# Patient Record
Sex: MALE | Race: WHITE | Employment: OTHER | URBAN - NONMETROPOLITAN AREA
[De-identification: names, ages, dates, MRNs, and addresses within clinical notes are randomized per-mention and may not be internally consistent; named-entity substitution may affect disease eponyms.]

---

## 2018-08-03 ENCOUNTER — HOSPITAL ENCOUNTER (OUTPATIENT)
Dept: GENERAL RADIOLOGY | Age: 68
Discharge: HOME | End: 2018-08-03
Payer: MEDICARE

## 2018-08-03 ENCOUNTER — HOSPITAL ENCOUNTER (OUTPATIENT)
Age: 68
Discharge: HOME | End: 2018-08-03
Payer: MEDICARE

## 2018-08-03 DIAGNOSIS — M25.562 LEFT KNEE PAIN, UNSPECIFIED CHRONICITY: ICD-10-CM

## 2018-08-03 PROCEDURE — 73564 X-RAY EXAM KNEE 4 OR MORE: CPT

## 2018-08-29 ENCOUNTER — HOSPITAL ENCOUNTER (OUTPATIENT)
Dept: MRI IMAGING | Age: 68
Discharge: HOME | End: 2018-08-29
Payer: MEDICARE

## 2018-08-29 DIAGNOSIS — M25.562 LEFT KNEE PAIN, UNSPECIFIED CHRONICITY: ICD-10-CM

## 2018-08-29 PROCEDURE — 73721 MRI JNT OF LWR EXTRE W/O DYE: CPT

## 2018-10-10 ENCOUNTER — HOSPITAL ENCOUNTER (OUTPATIENT)
Dept: CT IMAGING | Age: 68
Discharge: HOME | End: 2018-10-10
Payer: MEDICARE

## 2018-10-10 DIAGNOSIS — I71.20 THORACIC AORTIC ANEURYSM WITHOUT RUPTURE: ICD-10-CM

## 2018-10-10 LAB — POC CREATININE WHOLE BLOOD: 0.9 MG/DL (ref 0.5–1.2)

## 2018-10-10 PROCEDURE — 6360000004 HC RX CONTRAST MEDICATION: Performed by: THORACIC SURGERY (CARDIOTHORACIC VASCULAR SURGERY)

## 2018-10-10 PROCEDURE — 82565 ASSAY OF CREATININE: CPT

## 2018-10-10 PROCEDURE — 71275 CT ANGIOGRAPHY CHEST: CPT

## 2018-10-10 RX ADMIN — IOPAMIDOL 90 ML: 755 INJECTION, SOLUTION INTRAVENOUS at 09:16

## 2019-04-08 ENCOUNTER — HOSPITAL ENCOUNTER (EMERGENCY)
Age: 69
Discharge: HOME | End: 2019-04-08
Attending: EMERGENCY MEDICINE
Payer: MEDICARE

## 2019-04-08 ENCOUNTER — APPOINTMENT (OUTPATIENT)
Dept: CT IMAGING | Age: 69
End: 2019-04-08
Payer: MEDICARE

## 2019-04-08 VITALS
HEIGHT: 68 IN | DIASTOLIC BLOOD PRESSURE: 92 MMHG | SYSTOLIC BLOOD PRESSURE: 143 MMHG | HEART RATE: 70 BPM | TEMPERATURE: 97.5 F | WEIGHT: 210 LBS | BODY MASS INDEX: 31.83 KG/M2 | RESPIRATION RATE: 16 BRPM | OXYGEN SATURATION: 94 %

## 2019-04-08 DIAGNOSIS — R73.9 HYPERGLYCEMIA: ICD-10-CM

## 2019-04-08 DIAGNOSIS — I49.3 FREQUENT PVCS: Primary | ICD-10-CM

## 2019-04-08 LAB
ALBUMIN SERPL-MCNC: 4.2 G/DL (ref 3.5–5.1)
ALP BLD-CCNC: 55 U/L (ref 38–126)
ALT SERPL-CCNC: 48 U/L (ref 11–66)
ANION GAP SERPL CALCULATED.3IONS-SCNC: 15 MEQ/L (ref 8–16)
AST SERPL-CCNC: 32 U/L (ref 5–40)
BASOPHILS # BLD: 0.7 %
BASOPHILS ABSOLUTE: 0.1 THOU/MM3 (ref 0–0.1)
BILIRUB SERPL-MCNC: 1.2 MG/DL (ref 0.3–1.2)
BUN BLDV-MCNC: 18 MG/DL (ref 7–22)
CALCIUM SERPL-MCNC: 9 MG/DL (ref 8.5–10.5)
CHLORIDE BLD-SCNC: 101 MEQ/L (ref 98–111)
CO2: 24 MEQ/L (ref 23–33)
CREAT SERPL-MCNC: 1 MG/DL (ref 0.4–1.2)
EKG ATRIAL RATE: 88 BPM
EKG P AXIS: 15 DEGREES
EKG P-R INTERVAL: 150 MS
EKG Q-T INTERVAL: 360 MS
EKG QRS DURATION: 84 MS
EKG QTC CALCULATION (BAZETT): 435 MS
EKG R AXIS: 11 DEGREES
EKG T AXIS: 55 DEGREES
EKG VENTRICULAR RATE: 88 BPM
EOSINOPHIL # BLD: 4.6 %
EOSINOPHILS ABSOLUTE: 0.4 THOU/MM3 (ref 0–0.4)
ERYTHROCYTE [DISTWIDTH] IN BLOOD BY AUTOMATED COUNT: 13.6 % (ref 11.5–14.5)
ERYTHROCYTE [DISTWIDTH] IN BLOOD BY AUTOMATED COUNT: 46.5 FL (ref 35–45)
GFR SERPL CREATININE-BSD FRML MDRD: 74 ML/MIN/1.73M2
GLUCOSE BLD-MCNC: 228 MG/DL (ref 70–108)
GLUCOSE BLD-MCNC: 89 MG/DL (ref 70–108)
HCT VFR BLD CALC: 48 % (ref 42–52)
HEMOGLOBIN: 16.2 GM/DL (ref 14–18)
IMMATURE GRANS (ABS): 0.07 THOU/MM3 (ref 0–0.07)
IMMATURE GRANULOCYTES: 0.8 %
LYMPHOCYTES # BLD: 40.2 %
LYMPHOCYTES ABSOLUTE: 3.4 THOU/MM3 (ref 1–4.8)
MCH RBC QN AUTO: 31.6 PG (ref 26–33)
MCHC RBC AUTO-ENTMCNC: 33.8 GM/DL (ref 32.2–35.5)
MCV RBC AUTO: 93.6 FL (ref 80–94)
MONOCYTES # BLD: 6.2 %
MONOCYTES ABSOLUTE: 0.5 THOU/MM3 (ref 0.4–1.3)
NUCLEATED RED BLOOD CELLS: 0 /100 WBC
OSMOLALITY CALCULATION: 288.5 MOSMOL/KG (ref 275–300)
PLATELET # BLD: 352 THOU/MM3 (ref 130–400)
PMV BLD AUTO: 10.5 FL (ref 9.4–12.4)
POTASSIUM SERPL-SCNC: 4.1 MEQ/L (ref 3.5–5.2)
RBC # BLD: 5.13 MILL/MM3 (ref 4.7–6.1)
SEG NEUTROPHILS: 47.5 %
SEGMENTED NEUTROPHILS ABSOLUTE COUNT: 4 THOU/MM3 (ref 1.8–7.7)
SODIUM BLD-SCNC: 140 MEQ/L (ref 135–145)
TOTAL PROTEIN: 7.1 G/DL (ref 6.1–8)
TROPONIN T: < 0.01 NG/ML
TROPONIN T: < 0.01 NG/ML
TSH SERPL DL<=0.05 MIU/L-ACNC: 0.72 UIU/ML (ref 0.4–4.2)
WBC # BLD: 8.5 THOU/MM3 (ref 4.8–10.8)

## 2019-04-08 PROCEDURE — 84484 ASSAY OF TROPONIN QUANT: CPT

## 2019-04-08 PROCEDURE — 82948 REAGENT STRIP/BLOOD GLUCOSE: CPT

## 2019-04-08 PROCEDURE — 85025 COMPLETE CBC W/AUTO DIFF WBC: CPT

## 2019-04-08 PROCEDURE — 80053 COMPREHEN METABOLIC PANEL: CPT

## 2019-04-08 PROCEDURE — 6360000004 HC RX CONTRAST MEDICATION: Performed by: EMERGENCY MEDICINE

## 2019-04-08 PROCEDURE — 84443 ASSAY THYROID STIM HORMONE: CPT

## 2019-04-08 PROCEDURE — 71275 CT ANGIOGRAPHY CHEST: CPT

## 2019-04-08 PROCEDURE — 93010 ELECTROCARDIOGRAM REPORT: CPT | Performed by: NUCLEAR MEDICINE

## 2019-04-08 PROCEDURE — 93005 ELECTROCARDIOGRAM TRACING: CPT | Performed by: EMERGENCY MEDICINE

## 2019-04-08 PROCEDURE — 2580000003 HC RX 258: Performed by: EMERGENCY MEDICINE

## 2019-04-08 PROCEDURE — 99285 EMERGENCY DEPT VISIT HI MDM: CPT

## 2019-04-08 PROCEDURE — 36415 COLL VENOUS BLD VENIPUNCTURE: CPT

## 2019-04-08 RX ORDER — 0.9 % SODIUM CHLORIDE 0.9 %
250 INTRAVENOUS SOLUTION INTRAVENOUS ONCE
Status: COMPLETED | OUTPATIENT
Start: 2019-04-08 | End: 2019-04-08

## 2019-04-08 RX ORDER — HYDROCHLOROTHIAZIDE 12.5 MG/1
12.5 CAPSULE, GELATIN COATED ORAL DAILY
COMMUNITY

## 2019-04-08 RX ADMIN — SODIUM CHLORIDE 250 ML: 9 INJECTION, SOLUTION INTRAVENOUS at 12:20

## 2019-04-08 RX ADMIN — IOPAMIDOL 80 ML: 755 INJECTION, SOLUTION INTRAVENOUS at 12:51

## 2019-04-08 ASSESSMENT — ENCOUNTER SYMPTOMS
WHEEZING: 0
PHOTOPHOBIA: 0
ABDOMINAL PAIN: 0
BLOOD IN STOOL: 0
BACK PAIN: 1
CHEST TIGHTNESS: 0
NAUSEA: 0
RHINORRHEA: 0
SHORTNESS OF BREATH: 0
CONSTIPATION: 0
COUGH: 0
SORE THROAT: 0
DIARRHEA: 0
EYE PAIN: 0
VOMITING: 0

## 2019-04-08 ASSESSMENT — PAIN DESCRIPTION - LOCATION: LOCATION: BACK

## 2019-04-08 ASSESSMENT — PAIN DESCRIPTION - ORIENTATION: ORIENTATION: UPPER;MID

## 2019-04-08 ASSESSMENT — PAIN SCALES - GENERAL: PAINLEVEL_OUTOF10: 1

## 2019-04-08 ASSESSMENT — PAIN DESCRIPTION - PAIN TYPE: TYPE: ACUTE PAIN

## 2019-04-08 NOTE — ED NOTES
Checked patients blood glucose. Patient reports feeling better. Kylie Rivers NP notified.       Joaquim Thompson RN  04/08/19 0270

## 2019-04-08 NOTE — ED NOTES
Called this RN to room. States he feels like his blood sugar is dropping.  Gave patient orange juice and turkey Salina Medel RN  04/08/19 2979

## 2019-04-08 NOTE — ED TRIAGE NOTES
Presents to ED with c/o dizziness. Reports he was at [employer] today, became dizzy and checked his radial pulse and felt it skipped a beat. Reports it makes \"me feel like my throat is closing\". Denies symptoms at this time. Reports having a lot of stress in his family. Respirations easy and unlabored.

## 2019-04-08 NOTE — ED PROVIDER NOTES
[facility]  eMERGENCY dEPARTMENT eNCOUnter          CHIEF COMPLAINT       Chief Complaint   Patient presents with    Dizziness       Nurses Notes reviewed and I agree except as noted in the HPI. HISTORY OF PRESENT ILLNESS    Ellie Haji is a 80 y.o. male who presents to the Emergency Department for the evaluation of dizziness and chest pain. The patient reports a history of hypertension, anxiety, thoracic aortic aneurysm, and thyroid disease. He states that he had an urge to feel his radial pulse 4 days ago when he noticed some occasional palpitations. He reports that he has been checking his radial pulse since, and that he has continued to feel the palpitations. The patient states that he has experienced some anxiety over the extra beats, and that he experienced an episode of dizziness and left-sided chest pain with radiation into his left arm this morning. He denies experiencing any chest pain currently, but does report some upper back pain which he describes as tight and rates at a 1/10 in severity. The patient states that he helped move his son from [facility] back to [facility] prior to the onset of his symptoms where he may have strained his back. He denies that he has been experiencing any shortness of breath, abdominal pain, nausea, emesis, fever, chills, weakness, numbness, or tingling. The patient denies further complaints at initial encounter. The HPI was provided by the patient. REVIEW OF SYSTEMS     Review of Systems   Constitutional: Positive for fatigue. Negative for appetite change, chills, diaphoresis and fever. HENT: Negative for congestion, ear pain, rhinorrhea and sore throat. Eyes: Negative for photophobia, pain and visual disturbance. Respiratory: Negative for cough, chest tightness, shortness of breath and wheezing. Cardiovascular: Positive for chest pain (left-sided). Negative for palpitations and leg swelling.    Gastrointestinal: Negative for abdominal pain, blood in stool, constipation, diarrhea, nausea and vomiting. Genitourinary: Negative for difficulty urinating, dysuria and hematuria. Musculoskeletal: Positive for arthralgias (left arm) and back pain (upper). Negative for joint swelling, neck pain and neck stiffness. Skin: Negative for pallor and rash. Neurological: Positive for dizziness. Negative for syncope, weakness, light-headedness, numbness and headaches. Hematological: Negative for adenopathy. Psychiatric/Behavioral: Negative for confusion and suicidal ideas. The patient is nervous/anxious. PAST MEDICALHISTORY    has a past medical history of Hypertension and Thyroid disease. SURGICAL HISTORY      has a past surgical history that includes Tonsillectomy. CURRENT MEDICATIONS       Discharge Medication List as of 4/8/2019  2:44 PM      CONTINUE these medications which have NOT CHANGED    Details   hydrochlorothiazide (MICROZIDE) 12.5 MG capsule Take 12.5 mg by mouth dailyHistorical Med      benazepril (LOTENSIN) 20 MG tablet Take 20 mg by mouth 2 times daily. levothyroxine (SYNTHROID) 200 MCG tablet Take 225 mcg by mouth Daily. aspirin 81 MG tablet Take 81 mg by mouth daily. vitamin B-12 (CYANOCOBALAMIN) 1000 MCG tablet Take 1,000 mcg by mouth daily. ALPRAZolam (XANAX) 0.25 MG tablet Take 0.25 mg by mouth nightly as needed. ALLERGIES     is allergic to norvasc [amlodipine]. FAMILY HISTORY     has no family status information on file. family history is not on file. SOCIAL HISTORY      reports that he has never smoked. He does not have any smokeless tobacco history on file. He reports that he drinks about 3.5 oz of alcohol per week. PHYSICAL EXAM     INITIAL VITALS:  height is 5' 8\" (1.727 m) and weight is 210 lb (95.3 kg). His oral temperature is 97.5 °F (36.4 °C). His blood pressure is 143/92 (abnormal) and his pulse is 70. His respiration is 16 and oxygen saturation is 94%.     Physical Exam   Constitutional: He is oriented to person, place, and time. Vital signs are normal. He appears well-developed and well-nourished. Non-toxic appearance. No distress. HENT:   Head: Normocephalic and atraumatic. Right Ear: Hearing normal.   Left Ear: Hearing normal.   Nose: Nose normal. No rhinorrhea. Mouth/Throat: Uvula is midline, oropharynx is clear and moist and mucous membranes are normal. No oropharyngeal exudate. Eyes: Pupils are equal, round, and reactive to light. Conjunctivae, EOM and lids are normal. No scleral icterus. Neck: Normal range of motion. Neck supple. No neck rigidity. No tracheal deviation present. Cardiovascular: Normal rate, regular rhythm and normal heart sounds. Occasional extrasystoles are present. No murmur heard. Pulmonary/Chest: Effort normal and breath sounds normal. No stridor. No respiratory distress. He has no decreased breath sounds. He has no wheezes. Abdominal: Soft. He exhibits no distension. There is no tenderness. There is no rigidity and no guarding. Musculoskeletal: Normal range of motion. He exhibits no edema. Lymphadenopathy:     He has no cervical adenopathy. Neurological: He is alert and oriented to person, place, and time. He has normal strength. Gait normal. GCS eye subscore is 4. GCS verbal subscore is 5. GCS motor subscore is 6. No gross deficits observed   Skin: Skin is warm, dry and intact. No rash noted. He is not diaphoretic. No pallor. Psychiatric: His speech is normal and behavior is normal. Thought content normal. His mood appears anxious. Nursing note and vitals reviewed. DIFFERENTIAL DIAGNOSIS:   Including, but not limited to: Anxiety, ACS, hypokalemia, hypo/hyperthyroidism, rule out thoracic dissection    DIAGNOSTIC RESULTS     EKG: All EKG's are interpreted by the Emergency Department Physician who either signs or Co-signs thischart in the absence of a cardiologist.    Valdemar Barrios.  Rate: 88 bpm  RI interval: 150 ms  QRS duration: 84 ms  QTc: 435 ms  P-R-T axes: 15, 11, 55  Sinus rhythm with occasional premature ventricular complexes  Nonspecific ST abnormality  No STEMI  Compared to old EKG on March-    RADIOLOGY: non-plain film images(s) such as CT,Ultrasound and MRI are read by the radiologist.    [address] [place] [address]   Final Result      Ascending thoracic aorta measures 4.0 x  3.9 cm. No evidence for dissection. Appearance is stable from prior study. **This report has been created using voice recognition software. It may contain minor errors which are inherent in voice recognition technology. **      Final report electronically signed by Dr. Benitez Plascencia on 4/8/2019 1:16 PM          LABS:     Labs Reviewed   CBC WITH AUTO DIFFERENTIAL - Abnormal; Notable for the following components:       Result Value    RDW-SD 46.5 (*)     All other components within normal limits   COMPREHENSIVE METABOLIC PANEL - Abnormal; Notable for the following components:    Glucose 228 (*)     All other components within normal limits   GLOMERULAR FILTRATION RATE, ESTIMATED - Abnormal; Notable for the following components:    Est, Glom Filt Rate 74 (*)     All other components within normal limits   TSH WITHOUT REFLEX   TROPONIN   ANION GAP   OSMOLALITY   TROPONIN   POCT GLUCOSE       EMERGENCY DEPARTMENT COURSE:   Vitals:    Vitals:    04/08/19 1055 04/08/19 1212 04/08/19 1309   BP: (!) 143/92     Pulse: 90  70   Resp: 19 18 16   Temp: 97.5 °F (36.4 °C)     TempSrc: Oral     SpO2: 96% 94% 94%   Weight: 210 lb (95.3 kg)     Height: 5' 8\" (1.727 m)         12:02 PM: The patient was seen and evaluated. MDM:  The patient was seen within the ED today for the evaluation of dizziness and chest pain. The patient arrived in no acute distress and in stable condition. Within the department, I observed the patient's vital signs to be within acceptable range and his EKG revealed sinus rhythm with occasional premature ventricular complexes.  On exam, I appreciated occasional extrasystoles, normal lung sounds, that the patient was neurologically intact, and that he appeared anxious. Radiological studies within the department revealed his thoracic aorta measuring 4.0 x  3.9 cm which appeared stable. Laboratory work was reassuring. Within the department, the patient was treated with sodium chloride. I observed the patient's condition to remain stable during the duration of his stay. I explained my proposed course of treatment to the patient, who was amenable to my decision, and I answered all questions that were asked. He was discharged home in stable condition, and the patient will return to the ED if his symptoms become more severe in nature or otherwise change. I advised the patient to follow-up with his PCP. I also discussed return to ED precautions with the patient who verbalized understanding. CRITICAL CARE:   None     CONSULTS:  None    PROCEDURES:  None    FINAL IMPRESSION      1. Frequent PVCs    2. Hyperglycemia          DISPOSITION/PLAN   Discharged in stable condition    PATIENT REFERRED TO:  Catracho Hernandez MD  [address] [place]  [phone]            DISCHARGE MEDICATIONS:  Discharge Medication List as of 4/8/2019  2:44 PM          (Please note that portions of this note were completed with a voice recognition program.  Efforts were made to edit the dictations but occasionally words are mis-transcribed.)    Thepatient was given an opportunity to see the Emergency Attending. The patient voiced understanding that I was a Mid-Level Provider and was in agreement with being seen independently by myself. Scribe:  Marvin Nina 4/8/19 12:02 PM Scribing for and in the presence of Marke Figures, CNP.     Signed by: Bert Coy, 04/08/19 4:13 PM    Provider:  I personally performed the services described in the documentation, reviewed and edited the documentation which was dictated to the scribe in my presence, and it accurately records my words and actions.     Feliberto Summers, CNP 4/8/19 4:13 PM       JAMAR Brown - CNP  04/08/19 6115

## 2019-04-08 NOTE — ED NOTES
Patient resting in bed. Respirations easy and unlabored. No distress noted. Call light within reach. Updated on POC. Will monitor.       Anayeli Quinn RN  04/08/19 9929

## 2019-05-13 ENCOUNTER — HOSPITAL ENCOUNTER (OUTPATIENT)
Dept: NON INVASIVE DIAGNOSTICS | Age: 69
Discharge: HOME | End: 2019-05-13
Payer: MEDICARE

## 2019-05-13 PROCEDURE — 93017 CV STRESS TEST TRACING ONLY: CPT

## 2019-05-13 PROCEDURE — 2709999900 HC NON-CHARGEABLE SUPPLY

## 2019-05-13 PROCEDURE — 78452 HT MUSCLE IMAGE SPECT MULT: CPT

## 2019-05-13 PROCEDURE — 3430000000 HC RX DIAGNOSTIC RADIOPHARMACEUTICAL: Performed by: FAMILY MEDICINE

## 2019-05-13 PROCEDURE — 93017 CV STRESS TEST TRACING ONLY: CPT | Performed by: INTERNAL MEDICINE

## 2019-05-13 PROCEDURE — A9500 TC99M SESTAMIBI: HCPCS | Performed by: FAMILY MEDICINE

## 2019-05-13 PROCEDURE — 6360000002 HC RX W HCPCS

## 2019-05-13 RX ADMIN — Medication 33 MILLICURIE: at 09:42

## 2019-05-13 RX ADMIN — Medication 9.9 MILLICURIE: at 08:25

## 2021-07-13 ENCOUNTER — HOSPITAL ENCOUNTER (OUTPATIENT)
Dept: GENERAL RADIOLOGY | Age: 71
Discharge: HOME | End: 2021-07-13
Payer: MEDICARE

## 2021-07-13 ENCOUNTER — HOSPITAL ENCOUNTER (OUTPATIENT)
Age: 71
Discharge: HOME | End: 2021-07-13
Payer: MEDICARE

## 2021-07-13 DIAGNOSIS — R52 PAIN: ICD-10-CM

## 2021-07-13 PROCEDURE — 73564 X-RAY EXAM KNEE 4 OR MORE: CPT

## 2021-09-28 ENCOUNTER — HOSPITAL ENCOUNTER (OUTPATIENT)
Dept: CT IMAGING | Age: 71
Discharge: HOME | End: 2021-09-28
Payer: MEDICARE

## 2021-09-28 DIAGNOSIS — I71.21 ASCENDING AORTIC ANEURYSM: ICD-10-CM

## 2021-09-28 LAB — POC CREATININE WHOLE BLOOD: 1.1 MG/DL (ref 0.5–1.2)

## 2021-09-28 PROCEDURE — 71275 CT ANGIOGRAPHY CHEST: CPT

## 2021-09-28 PROCEDURE — 82565 ASSAY OF CREATININE: CPT

## 2021-09-28 PROCEDURE — 6360000004 HC RX CONTRAST MEDICATION: Performed by: THORACIC SURGERY (CARDIOTHORACIC VASCULAR SURGERY)

## 2021-09-28 RX ADMIN — IOPAMIDOL 85 ML: 755 INJECTION, SOLUTION INTRAVENOUS at 10:05

## 2022-08-04 ENCOUNTER — NURSE ONLY (OUTPATIENT)
Dept: LAB | Age: 72
End: 2022-08-04

## 2022-08-04 LAB
ALBUMIN SERPL-MCNC: 4.6 G/DL (ref 3.5–5.1)
ALP BLD-CCNC: 59 U/L (ref 38–126)
ALT SERPL-CCNC: 31 U/L (ref 11–66)
ANION GAP SERPL CALCULATED.3IONS-SCNC: 12 MEQ/L (ref 8–16)
AST SERPL-CCNC: 27 U/L (ref 5–40)
BILIRUB SERPL-MCNC: 0.7 MG/DL (ref 0.3–1.2)
BUN BLDV-MCNC: 22 MG/DL (ref 7–22)
CALCIUM SERPL-MCNC: 9.8 MG/DL (ref 8.5–10.5)
CHLORIDE BLD-SCNC: 102 MEQ/L (ref 98–111)
CO2: 27 MEQ/L (ref 23–33)
CREAT SERPL-MCNC: 1.1 MG/DL (ref 0.4–1.2)
ERYTHROCYTE [DISTWIDTH] IN BLOOD BY AUTOMATED COUNT: 14.5 % (ref 11.5–14.5)
ERYTHROCYTE [DISTWIDTH] IN BLOOD BY AUTOMATED COUNT: 49.8 FL (ref 35–45)
GFR SERPL CREATININE-BSD FRML MDRD: 66 ML/MIN/1.73M2
GLUCOSE BLD-MCNC: 81 MG/DL (ref 70–108)
HCT VFR BLD CALC: 48.5 % (ref 42–52)
HEMOGLOBIN: 15.9 GM/DL (ref 14–18)
MCH RBC QN AUTO: 30.8 PG (ref 26–33)
MCHC RBC AUTO-ENTMCNC: 32.8 GM/DL (ref 32.2–35.5)
MCV RBC AUTO: 94 FL (ref 80–94)
PLATELET # BLD: 364 THOU/MM3 (ref 130–400)
PMV BLD AUTO: 11 FL (ref 9.4–12.4)
POTASSIUM SERPL-SCNC: 4.7 MEQ/L (ref 3.5–5.2)
RBC # BLD: 5.16 MILL/MM3 (ref 4.7–6.1)
SODIUM BLD-SCNC: 141 MEQ/L (ref 135–145)
TOTAL PROTEIN: 7.3 G/DL (ref 6.1–8)
TSH SERPL DL<=0.05 MIU/L-ACNC: 1.67 UIU/ML (ref 0.4–4.2)
WBC # BLD: 10.2 THOU/MM3 (ref 4.8–10.8)

## 2022-08-05 LAB
PROSTATE SPECIFIC ANTIGEN: 1.82 NG/ML (ref 0–1)
VITAMIN B-12: 1002 PG/ML (ref 211–911)
VITAMIN D 25-HYDROXY: 120 NG/ML (ref 30–100)

## 2022-11-02 ENCOUNTER — HOSPITAL ENCOUNTER (OUTPATIENT)
Age: 72
Discharge: HOME | End: 2022-11-02
Payer: MEDICARE

## 2022-11-02 ENCOUNTER — HOSPITAL ENCOUNTER (OUTPATIENT)
Dept: GENERAL RADIOLOGY | Age: 72
Discharge: HOME | End: 2022-11-02
Payer: MEDICARE

## 2022-11-02 ENCOUNTER — NURSE ONLY (OUTPATIENT)
Dept: LAB | Age: 72
End: 2022-11-02

## 2022-11-02 DIAGNOSIS — M25.561 RIGHT KNEE PAIN, UNSPECIFIED CHRONICITY: ICD-10-CM

## 2022-11-02 LAB
BASOPHILS # BLD: 1 %
BASOPHILS ABSOLUTE: 0.1 THOU/MM3 (ref 0–0.1)
EOSINOPHIL # BLD: 4.1 %
EOSINOPHILS ABSOLUTE: 0.4 THOU/MM3 (ref 0–0.4)
ERYTHROCYTE [DISTWIDTH] IN BLOOD BY AUTOMATED COUNT: 13.3 % (ref 11.5–14.5)
ERYTHROCYTE [DISTWIDTH] IN BLOOD BY AUTOMATED COUNT: 45.3 FL (ref 35–45)
HCT VFR BLD CALC: 48.6 % (ref 42–52)
HEMOGLOBIN: 16.5 GM/DL (ref 14–18)
IMMATURE GRANS (ABS): 0.07 THOU/MM3 (ref 0–0.07)
IMMATURE GRANULOCYTES: 0.7 %
LYMPHOCYTES # BLD: 46 %
LYMPHOCYTES ABSOLUTE: 4.4 THOU/MM3 (ref 1–4.8)
MCH RBC QN AUTO: 31.4 PG (ref 26–33)
MCHC RBC AUTO-ENTMCNC: 34 GM/DL (ref 32.2–35.5)
MCV RBC AUTO: 92.6 FL (ref 80–94)
MONOCYTES # BLD: 11.4 %
MONOCYTES ABSOLUTE: 1.1 THOU/MM3 (ref 0.4–1.3)
NUCLEATED RED BLOOD CELLS: 0 /100 WBC
PLATELET # BLD: 369 THOU/MM3 (ref 130–400)
PMV BLD AUTO: 11.1 FL (ref 9.4–12.4)
RBC # BLD: 5.25 MILL/MM3 (ref 4.7–6.1)
SEG NEUTROPHILS: 36.8 %
SEGMENTED NEUTROPHILS ABSOLUTE COUNT: 3.5 THOU/MM3 (ref 1.8–7.7)
URIC ACID: 6.5 MG/DL (ref 3.7–7)
WBC # BLD: 9.5 THOU/MM3 (ref 4.8–10.8)

## 2022-11-02 PROCEDURE — 73560 X-RAY EXAM OF KNEE 1 OR 2: CPT

## 2023-04-17 ENCOUNTER — NURSE ONLY (OUTPATIENT)
Dept: LAB | Age: 73
End: 2023-04-17

## 2023-04-17 LAB — TSH SERPL DL<=0.005 MIU/L-ACNC: 1.02 UIU/ML (ref 0.4–4.2)

## 2023-08-11 ENCOUNTER — NURSE ONLY (OUTPATIENT)
Dept: LAB | Age: 73
End: 2023-08-11

## 2023-08-11 LAB
25(OH)D3 SERPL-MCNC: 50 NG/ML (ref 30–100)
ALBUMIN SERPL BCG-MCNC: 4.4 G/DL (ref 3.5–5.1)
ALP SERPL-CCNC: 52 U/L (ref 38–126)
ALT SERPL W/O P-5'-P-CCNC: 43 U/L (ref 11–66)
ANION GAP SERPL CALC-SCNC: 14 MEQ/L (ref 8–16)
AST SERPL-CCNC: 41 U/L (ref 5–40)
BASOPHILS ABSOLUTE: 0.1 THOU/MM3 (ref 0–0.1)
BASOPHILS NFR BLD AUTO: 1.2 %
BILIRUB SERPL-MCNC: 1.3 MG/DL (ref 0.3–1.2)
BUN SERPL-MCNC: 23 MG/DL (ref 7–22)
CALCIUM SERPL-MCNC: 9.5 MG/DL (ref 8.5–10.5)
CHLORIDE SERPL-SCNC: 101 MEQ/L (ref 98–111)
CHOLEST SERPL-MCNC: 176 MG/DL (ref 100–199)
CO2 SERPL-SCNC: 24 MEQ/L (ref 23–33)
CREAT SERPL-MCNC: 1.1 MG/DL (ref 0.4–1.2)
DEPRECATED RDW RBC AUTO: 46.5 FL (ref 35–45)
EOSINOPHIL NFR BLD AUTO: 4.8 %
EOSINOPHILS ABSOLUTE: 0.4 THOU/MM3 (ref 0–0.4)
ERYTHROCYTE [DISTWIDTH] IN BLOOD BY AUTOMATED COUNT: 13.9 % (ref 11.5–14.5)
GFR SERPL CREATININE-BSD FRML MDRD: > 60 ML/MIN/1.73M2
GLUCOSE SERPL-MCNC: 111 MG/DL (ref 70–108)
HCT VFR BLD AUTO: 46.8 % (ref 42–52)
HDLC SERPL-MCNC: 59 MG/DL
HGB BLD-MCNC: 16 GM/DL (ref 14–18)
IMM GRANULOCYTES # BLD AUTO: 0.03 THOU/MM3 (ref 0–0.07)
IMM GRANULOCYTES NFR BLD AUTO: 0.4 %
LDLC SERPL CALC-MCNC: 101 MG/DL
LYMPHOCYTES ABSOLUTE: 3.1 THOU/MM3 (ref 1–4.8)
LYMPHOCYTES NFR BLD AUTO: 36.7 %
MCH RBC QN AUTO: 31.3 PG (ref 26–33)
MCHC RBC AUTO-ENTMCNC: 34.2 GM/DL (ref 32.2–35.5)
MCV RBC AUTO: 91.6 FL (ref 80–94)
MONOCYTES ABSOLUTE: 1 THOU/MM3 (ref 0.4–1.3)
MONOCYTES NFR BLD AUTO: 11.5 %
NEUTROPHILS NFR BLD AUTO: 45.4 %
NRBC BLD AUTO-RTO: 0 /100 WBC
PLATELET # BLD AUTO: 334 THOU/MM3 (ref 130–400)
PMV BLD AUTO: 11 FL (ref 9.4–12.4)
POTASSIUM SERPL-SCNC: 3.9 MEQ/L (ref 3.5–5.2)
PROT SERPL-MCNC: 7.4 G/DL (ref 6.1–8)
PSA SERPL-MCNC: 1.93 NG/ML (ref 0–1)
RBC # BLD AUTO: 5.11 MILL/MM3 (ref 4.7–6.1)
SEGMENTED NEUTROPHILS ABSOLUTE COUNT: 3.8 THOU/MM3 (ref 1.8–7.7)
SODIUM SERPL-SCNC: 139 MEQ/L (ref 135–145)
TRIGL SERPL-MCNC: 79 MG/DL (ref 0–199)
WBC # BLD AUTO: 8.4 THOU/MM3 (ref 4.8–10.8)

## 2023-08-23 ENCOUNTER — HOSPITAL ENCOUNTER (OUTPATIENT)
Dept: NON INVASIVE DIAGNOSTICS | Age: 73
Discharge: HOME | End: 2023-08-23
Payer: MEDICARE

## 2023-08-23 DIAGNOSIS — R07.89 OTHER CHEST PAIN: ICD-10-CM

## 2023-08-23 PROCEDURE — 93017 CV STRESS TEST TRACING ONLY: CPT | Performed by: INTERNAL MEDICINE

## 2023-09-25 ENCOUNTER — OFFICE VISIT (OUTPATIENT)
Dept: CARDIOLOGY CLINIC | Age: 73
End: 2023-09-25
Payer: MEDICARE

## 2023-09-25 VITALS
SYSTOLIC BLOOD PRESSURE: 128 MMHG | HEIGHT: 69 IN | BODY MASS INDEX: 32.56 KG/M2 | WEIGHT: 219.8 LBS | DIASTOLIC BLOOD PRESSURE: 82 MMHG | HEART RATE: 70 BPM

## 2023-09-25 DIAGNOSIS — I10 HYPERTENSION, UNSPECIFIED TYPE: ICD-10-CM

## 2023-09-25 DIAGNOSIS — I71.20 THORACIC AORTIC ANEURYSM (TAA), UNSPECIFIED PART, UNSPECIFIED WHETHER RUPTURED (HCC): ICD-10-CM

## 2023-09-25 DIAGNOSIS — R07.9 CHEST PAIN, UNSPECIFIED TYPE: Primary | ICD-10-CM

## 2023-09-25 PROCEDURE — 4004F PT TOBACCO SCREEN RCVD TLK: CPT | Performed by: INTERNAL MEDICINE

## 2023-09-25 PROCEDURE — 3017F COLORECTAL CA SCREEN DOC REV: CPT | Performed by: INTERNAL MEDICINE

## 2023-09-25 PROCEDURE — 93000 ELECTROCARDIOGRAM COMPLETE: CPT | Performed by: INTERNAL MEDICINE

## 2023-09-25 PROCEDURE — 3074F SYST BP LT 130 MM HG: CPT | Performed by: INTERNAL MEDICINE

## 2023-09-25 PROCEDURE — 99204 OFFICE O/P NEW MOD 45 MIN: CPT | Performed by: INTERNAL MEDICINE

## 2023-09-25 PROCEDURE — 1123F ACP DISCUSS/DSCN MKR DOCD: CPT | Performed by: INTERNAL MEDICINE

## 2023-09-25 PROCEDURE — G8427 DOCREV CUR MEDS BY ELIG CLIN: HCPCS | Performed by: INTERNAL MEDICINE

## 2023-09-25 PROCEDURE — G8417 CALC BMI ABV UP PARAM F/U: HCPCS | Performed by: INTERNAL MEDICINE

## 2023-09-25 PROCEDURE — 3079F DIAST BP 80-89 MM HG: CPT | Performed by: INTERNAL MEDICINE

## 2023-09-25 RX ORDER — METOPROLOL SUCCINATE 25 MG/1
25 TABLET, EXTENDED RELEASE ORAL DAILY
Qty: 30 TABLET | Refills: 3 | Status: SHIPPED | OUTPATIENT
Start: 2023-09-25

## 2023-09-25 RX ORDER — NITROGLYCERIN 0.4 MG/1
0.4 TABLET SUBLINGUAL EVERY 5 MIN PRN
COMMUNITY

## 2023-09-25 NOTE — PROGRESS NOTES
[address] [address]  Dept: [phone]  Dept Fax: [phone]  Loc: [phone]    Visit Date: 9/25/2023    Mr. Rose Marie Jacobson is a 68 y.o. male  who presented for:  Chief Complaint   Patient presents with    Establish Cardiologist    New Patient    Check-Up       HPI:   HPI   67 yo M hx of HTN who presents for chest pain. Left sided. On and off.  1-2/10, usually with exertion, but improves with rest.  No rest pain. Does not wake up from sleep. Had 2/10 chest pain with exertion on the treadmill, but no ECG changes. He was in [facility], having similar chest pain, had 5/10 chest pain after sightseeing, used NTG SL and ASA which helped. Went to hospital in [facility], Trop negative x 2. No further work-up. He feels pain in his back pain. He notes recurrent PVCs with exertion as well that is new . He was at home working in garden and then felt woozy with BP in the 80-90s. ECG shows junctional rhythm at base. 2019 - stress negative for ischemia. He has a 4.1 x 4.0 cm ascending aneurysm that is stable over 10 years. No smoke. Retired pharmacist.        Current Outpatient Medications:     ASPIRIN PO, Take by mouth, Disp: , Rfl:     nitroGLYCERIN (NITROSTAT) 0.4 MG SL tablet, Place 1 tablet under the tongue every 5 minutes as needed for Chest pain up to max of 3 total doses. If no relief after 1 dose, call 911., Disp: , Rfl:     hydrochlorothiazide (MICROZIDE) 12.5 MG capsule, Take 2 capsules by mouth daily, Disp: , Rfl:     benazepril (LOTENSIN) 20 MG tablet, Take 1 tablet by mouth 2 times daily, Disp: , Rfl:     levothyroxine (SYNTHROID) 200 MCG tablet, Take 1 tablet by mouth Daily, Disp: , Rfl:     ALPRAZolam (XANAX) 0.25 MG tablet, Take 1 tablet by mouth nightly as needed. , Disp: , Rfl:     Past Medical History  Mac Monae  has a past medical history of Hypertension and Thyroid disease.     Social History  Mac Monae

## 2023-09-26 ENCOUNTER — TELEPHONE (OUTPATIENT)
Dept: CARDIOLOGY CLINIC | Age: 73
End: 2023-09-26

## 2023-09-26 NOTE — TELEPHONE ENCOUNTER
Cardiac cath scheduled for 10/02/2023 requires a peer to peer, [facility] clinical review nurse states they require further clinical than what I submitted. Case number is [id number]    Phone number to schedule peer to peer is [phone]    Please advise.

## 2023-09-27 ENCOUNTER — PRE-PROCEDURE TELEPHONE (OUTPATIENT)
Dept: INPATIENT UNIT | Age: 73
End: 2023-09-27

## 2023-09-27 NOTE — PROGRESS NOTES
Called patient at home, no answer, message left for patient where to go. NPO after midnight, 12-14 hour fast.   Bring drivers license and insurance information. Wear comfortable clean clothes. Shower night before and morning of with liquid antibacterial soap(dial). Remove Jewelry, leave valuables at home. You may have to stay overnight, so pack a small bag of essentials. Bring CPAP. Please bring medications in original bottles. Follow all instructions given to you by your doctor. Please notify you doctor if you need to cancel or reschedule.  is needed at discharge. Heart medications allowed with a sip of water, hold diabetic meds am of procedure.

## 2023-09-27 NOTE — TELEPHONE ENCOUNTER
PROCEDURE: CARDIAC CATH     DATE OF SERVICE: 10/02/2023    SERVICE LOCATION: [facility]    CPT CODE: 28947    PHYSICIAN: DR WYNNE     DATE PRIOR AUTH SUBMITTED: 09/26/2023    STATUS: APPROVED.      CASE NUMBER: [id number]    AUTH NUMBER: [id number]    VALID: 09/26/2023-11/11/2023

## 2023-09-29 ENCOUNTER — PREP FOR PROCEDURE (OUTPATIENT)
Dept: CARDIOLOGY | Age: 73
End: 2023-09-29

## 2023-09-29 RX ORDER — ASPIRIN 325 MG
325 TABLET ORAL ONCE
Status: CANCELLED | OUTPATIENT
Start: 2023-09-29 | End: 2023-09-29

## 2023-09-29 RX ORDER — SODIUM CHLORIDE 9 MG/ML
INJECTION, SOLUTION INTRAVENOUS CONTINUOUS
Status: CANCELLED | OUTPATIENT
Start: 2023-09-29

## 2023-09-29 RX ORDER — SODIUM CHLORIDE 0.9 % (FLUSH) 0.9 %
5-40 SYRINGE (ML) INJECTION PRN
Status: CANCELLED | OUTPATIENT
Start: 2023-09-29

## 2023-09-29 RX ORDER — NITROGLYCERIN 0.4 MG/1
0.4 TABLET SUBLINGUAL EVERY 5 MIN PRN
Status: CANCELLED | OUTPATIENT
Start: 2023-09-29

## 2023-09-29 RX ORDER — SODIUM CHLORIDE 0.9 % (FLUSH) 0.9 %
5-40 SYRINGE (ML) INJECTION EVERY 12 HOURS SCHEDULED
Status: CANCELLED | OUTPATIENT
Start: 2023-09-29

## 2023-09-29 RX ORDER — SODIUM CHLORIDE 9 MG/ML
INJECTION, SOLUTION INTRAVENOUS PRN
Status: CANCELLED | OUTPATIENT
Start: 2023-09-29

## 2023-10-02 ENCOUNTER — HOSPITAL ENCOUNTER (OUTPATIENT)
Dept: INPATIENT UNIT | Age: 73
Discharge: HOME | End: 2023-10-03
Attending: INTERNAL MEDICINE | Admitting: INTERNAL MEDICINE
Payer: MEDICARE

## 2023-10-02 DIAGNOSIS — Z98.61 CAD S/P PERCUTANEOUS CORONARY ANGIOPLASTY: Primary | ICD-10-CM

## 2023-10-02 DIAGNOSIS — I25.10 CAD S/P PERCUTANEOUS CORONARY ANGIOPLASTY: Primary | ICD-10-CM

## 2023-10-02 DIAGNOSIS — I49.3 PVC (PREMATURE VENTRICULAR CONTRACTION): ICD-10-CM

## 2023-10-02 DIAGNOSIS — I10 PRIMARY HYPERTENSION: ICD-10-CM

## 2023-10-02 PROBLEM — R07.9 CHEST PAIN: Status: ACTIVE | Noted: 2023-10-02

## 2023-10-02 LAB
ABO: NORMAL
ACTIVATED CLOTTING TIME: 263 SECONDS (ref 1–150)
ANION GAP SERPL CALC-SCNC: 9 MEQ/L (ref 8–16)
ANTIBODY SCREEN: NORMAL
APTT PPP: 32.2 SECONDS (ref 22–38)
BUN SERPL-MCNC: 17 MG/DL (ref 7–22)
CALCIUM SERPL-MCNC: 8.9 MG/DL (ref 8.5–10.5)
CHLORIDE SERPL-SCNC: 104 MEQ/L (ref 98–111)
CHOLEST SERPL-MCNC: 178 MG/DL (ref 100–199)
CO2 SERPL-SCNC: 26 MEQ/L (ref 23–33)
CREAT SERPL-MCNC: 1 MG/DL (ref 0.4–1.2)
DEPRECATED RDW RBC AUTO: 47.8 FL (ref 35–45)
ERYTHROCYTE [DISTWIDTH] IN BLOOD BY AUTOMATED COUNT: 13.8 % (ref 11.5–14.5)
GFR SERPL CREATININE-BSD FRML MDRD: > 60 ML/MIN/1.73M2
GLUCOSE SERPL-MCNC: 100 MG/DL (ref 70–108)
HCT VFR BLD AUTO: 47.8 % (ref 42–52)
HDLC SERPL-MCNC: 56 MG/DL
HGB BLD-MCNC: 16.4 GM/DL (ref 14–18)
INR PPP: 0.88 (ref 0.85–1.13)
LDLC SERPL CALC-MCNC: 102 MG/DL
MCH RBC QN AUTO: 32.4 PG (ref 26–33)
MCHC RBC AUTO-ENTMCNC: 34.3 GM/DL (ref 32.2–35.5)
MCV RBC AUTO: 94.5 FL (ref 80–94)
PLATELET # BLD AUTO: 342 THOU/MM3 (ref 130–400)
PMV BLD AUTO: 10.5 FL (ref 9.4–12.4)
POTASSIUM SERPL-SCNC: 4.1 MEQ/L (ref 3.5–5.2)
RBC # BLD AUTO: 5.06 MILL/MM3 (ref 4.7–6.1)
RH FACTOR: NORMAL
SODIUM SERPL-SCNC: 139 MEQ/L (ref 135–145)
TRIGL SERPL-MCNC: 99 MG/DL (ref 0–199)
WBC # BLD AUTO: 9.4 THOU/MM3 (ref 4.8–10.8)

## 2023-10-02 PROCEDURE — 86850 RBC ANTIBODY SCREEN: CPT

## 2023-10-02 PROCEDURE — 6370000000 HC RX 637 (ALT 250 FOR IP)

## 2023-10-02 PROCEDURE — 93010 ELECTROCARDIOGRAM REPORT: CPT | Performed by: INTERNAL MEDICINE

## 2023-10-02 PROCEDURE — 6360000002 HC RX W HCPCS

## 2023-10-02 PROCEDURE — 80048 BASIC METABOLIC PNL TOTAL CA: CPT

## 2023-10-02 PROCEDURE — 86900 BLOOD TYPING SEROLOGIC ABO: CPT

## 2023-10-02 PROCEDURE — 85027 COMPLETE CBC AUTOMATED: CPT

## 2023-10-02 PROCEDURE — 85347 COAGULATION TIME ACTIVATED: CPT

## 2023-10-02 PROCEDURE — C9600 PERC DRUG-EL COR STENT SING: HCPCS

## 2023-10-02 PROCEDURE — 85610 PROTHROMBIN TIME: CPT

## 2023-10-02 PROCEDURE — 6370000000 HC RX 637 (ALT 250 FOR IP): Performed by: INTERNAL MEDICINE

## 2023-10-02 PROCEDURE — 93454 CORONARY ARTERY ANGIO S&I: CPT

## 2023-10-02 PROCEDURE — 2580000003 HC RX 258: Performed by: PHYSICIAN ASSISTANT

## 2023-10-02 PROCEDURE — 36415 COLL VENOUS BLD VENIPUNCTURE: CPT

## 2023-10-02 PROCEDURE — 92928 PRQ TCAT PLMT NTRAC ST 1 LES: CPT | Performed by: INTERNAL MEDICINE

## 2023-10-02 PROCEDURE — 93005 ELECTROCARDIOGRAM TRACING: CPT | Performed by: PHYSICIAN ASSISTANT

## 2023-10-02 PROCEDURE — 93454 CORONARY ARTERY ANGIO S&I: CPT | Performed by: INTERNAL MEDICINE

## 2023-10-02 PROCEDURE — 85730 THROMBOPLASTIN TIME PARTIAL: CPT

## 2023-10-02 PROCEDURE — 86901 BLOOD TYPING SEROLOGIC RH(D): CPT

## 2023-10-02 PROCEDURE — 6360000004 HC RX CONTRAST MEDICATION: Performed by: INTERNAL MEDICINE

## 2023-10-02 PROCEDURE — 2500000003 HC RX 250 WO HCPCS

## 2023-10-02 PROCEDURE — 80061 LIPID PANEL: CPT

## 2023-10-02 RX ORDER — NITROGLYCERIN 0.4 MG/1
0.4 TABLET SUBLINGUAL EVERY 5 MIN PRN
Status: DISCONTINUED | OUTPATIENT
Start: 2023-10-02 | End: 2023-10-03 | Stop reason: HOSPADM

## 2023-10-02 RX ORDER — NITROGLYCERIN 0.4 MG/1
0.4 TABLET SUBLINGUAL EVERY 5 MIN PRN
Status: DISCONTINUED | OUTPATIENT
Start: 2023-10-02 | End: 2023-10-02 | Stop reason: SDUPTHER

## 2023-10-02 RX ORDER — BENAZEPRIL HYDROCHLORIDE 40 MG/1
20 TABLET, FILM COATED ORAL 2 TIMES DAILY
Status: ON HOLD | COMMUNITY
End: 2023-10-03 | Stop reason: SDUPTHER

## 2023-10-02 RX ORDER — HYDROCHLOROTHIAZIDE 25 MG/1
25 TABLET ORAL DAILY
Status: DISCONTINUED | OUTPATIENT
Start: 2023-10-03 | End: 2023-10-03 | Stop reason: HOSPADM

## 2023-10-02 RX ORDER — LEVOTHYROXINE SODIUM 0.1 MG/1
200 TABLET ORAL DAILY
Status: DISCONTINUED | OUTPATIENT
Start: 2023-10-03 | End: 2023-10-03 | Stop reason: HOSPADM

## 2023-10-02 RX ORDER — SODIUM CHLORIDE 9 MG/ML
INJECTION, SOLUTION INTRAVENOUS PRN
Status: DISCONTINUED | OUTPATIENT
Start: 2023-10-02 | End: 2023-10-02

## 2023-10-02 RX ORDER — SODIUM CHLORIDE 0.9 % (FLUSH) 0.9 %
5-40 SYRINGE (ML) INJECTION PRN
Status: DISCONTINUED | OUTPATIENT
Start: 2023-10-02 | End: 2023-10-02 | Stop reason: SDUPTHER

## 2023-10-02 RX ORDER — SODIUM CHLORIDE 0.9 % (FLUSH) 0.9 %
5-40 SYRINGE (ML) INJECTION EVERY 12 HOURS SCHEDULED
Status: DISCONTINUED | OUTPATIENT
Start: 2023-10-02 | End: 2023-10-02 | Stop reason: SDUPTHER

## 2023-10-02 RX ORDER — SODIUM CHLORIDE 9 MG/ML
INJECTION, SOLUTION INTRAVENOUS CONTINUOUS
Status: DISCONTINUED | OUTPATIENT
Start: 2023-10-02 | End: 2023-10-03 | Stop reason: HOSPADM

## 2023-10-02 RX ORDER — SODIUM CHLORIDE 9 MG/ML
INJECTION, SOLUTION INTRAVENOUS PRN
Status: DISCONTINUED | OUTPATIENT
Start: 2023-10-02 | End: 2023-10-03 | Stop reason: HOSPADM

## 2023-10-02 RX ORDER — ASPIRIN 81 MG/1
81 TABLET, CHEWABLE ORAL DAILY
Status: DISCONTINUED | OUTPATIENT
Start: 2023-10-03 | End: 2023-10-03 | Stop reason: HOSPADM

## 2023-10-02 RX ORDER — ISOSORBIDE MONONITRATE 30 MG/1
30 TABLET, EXTENDED RELEASE ORAL DAILY
Status: DISCONTINUED | OUTPATIENT
Start: 2023-10-03 | End: 2023-10-03 | Stop reason: HOSPADM

## 2023-10-02 RX ORDER — SODIUM CHLORIDE 9 MG/ML
INJECTION, SOLUTION INTRAVENOUS CONTINUOUS
Status: DISCONTINUED | OUTPATIENT
Start: 2023-10-02 | End: 2023-10-02

## 2023-10-02 RX ORDER — ATORVASTATIN CALCIUM 80 MG/1
80 TABLET, FILM COATED ORAL NIGHTLY
Status: DISCONTINUED | OUTPATIENT
Start: 2023-10-02 | End: 2023-10-03 | Stop reason: HOSPADM

## 2023-10-02 RX ORDER — ATROPINE SULFATE 0.4 MG/ML
0.5 INJECTION, SOLUTION INTRAVENOUS
Status: DISCONTINUED | OUTPATIENT
Start: 2023-10-02 | End: 2023-10-03 | Stop reason: HOSPADM

## 2023-10-02 RX ORDER — SODIUM CHLORIDE 0.9 % (FLUSH) 0.9 %
5-40 SYRINGE (ML) INJECTION EVERY 12 HOURS SCHEDULED
Status: DISCONTINUED | OUTPATIENT
Start: 2023-10-02 | End: 2023-10-03 | Stop reason: HOSPADM

## 2023-10-02 RX ORDER — HYDROCHLOROTHIAZIDE 25 MG/1
25 TABLET ORAL DAILY
COMMUNITY

## 2023-10-02 RX ORDER — ACETAMINOPHEN 325 MG/1
650 TABLET ORAL EVERY 4 HOURS PRN
Status: DISCONTINUED | OUTPATIENT
Start: 2023-10-02 | End: 2023-10-03 | Stop reason: HOSPADM

## 2023-10-02 RX ORDER — SODIUM CHLORIDE 0.9 % (FLUSH) 0.9 %
5-40 SYRINGE (ML) INJECTION PRN
Status: DISCONTINUED | OUTPATIENT
Start: 2023-10-02 | End: 2023-10-03 | Stop reason: HOSPADM

## 2023-10-02 RX ORDER — ASPIRIN 325 MG
325 TABLET ORAL ONCE
Status: DISCONTINUED | OUTPATIENT
Start: 2023-10-02 | End: 2023-10-03 | Stop reason: HOSPADM

## 2023-10-02 RX ORDER — ALPRAZOLAM 0.5 MG/1
0.25 TABLET ORAL NIGHTLY PRN
Status: DISCONTINUED | OUTPATIENT
Start: 2023-10-02 | End: 2023-10-03 | Stop reason: HOSPADM

## 2023-10-02 RX ORDER — METOPROLOL SUCCINATE 25 MG/1
25 TABLET, EXTENDED RELEASE ORAL DAILY
Status: DISCONTINUED | OUTPATIENT
Start: 2023-10-03 | End: 2023-10-03 | Stop reason: HOSPADM

## 2023-10-02 RX ADMIN — IOPAMIDOL 150 ML: 755 INJECTION, SOLUTION INTRAVENOUS at 12:42

## 2023-10-02 RX ADMIN — TICAGRELOR 90 MG: 90 TABLET ORAL at 21:07

## 2023-10-02 RX ADMIN — SODIUM CHLORIDE: 9 INJECTION, SOLUTION INTRAVENOUS at 10:00

## 2023-10-02 RX ADMIN — ATORVASTATIN CALCIUM 80 MG: 80 TABLET, FILM COATED ORAL at 22:25

## 2023-10-02 NOTE — H&P
[facility]  Sedation/Analgesia History & Physical    Pt Name: Freddie Harrington  Account number: [de-identified]  MRN: [id number]  YOB: 1950  Provider Performing Procedure: Basil Alba MD MD  Referring Provider: Basil Alba MD   Primary Care Physician: Angela Dimas MD  Date: 10/2/2023    PRE-PROCEDURE    Code Status: FULL CODE  Brief History/Pre-Procedure Diagnosis:   Progressive chest pain on OMT    Consent: : I have discussed with the patient risks, benefits, and alternatives (and relevant risks, benefits, and side effects related to alternatives or not receiving care), and likelihood of the success. The patient and/or representative appear to understand and agree to proceed. The discussion encompasses risks, benefits, and side effects related to the alternatives and the risks related to not receiving the proposed care, treatment, and services. The indication, risks and benefits of the procedure and possible therapeutic consequences and alternatives were discussed with the patient. The patient was given the opportunity to ask questions and to have them answered to his/her satisfaction. Risks of the procedure include but are not limited to the following: Bleeding, hematoma including retroperitoneal hemmorhage, infection, pain and discomfort, injury to the aorta and other blood vessels, rhythm disturbance, low blood pressure, myocardial infarction, stroke, kidney damage/failure, myocardial perforation, allergic reactions to sedatives/contrast material, loss of pulse/vascular compromise requiring surgery, aneurysm/pseudoaneurysm formation, possible loss of a limb/hand/leg, needing blood transfusion, requiring emergent open heart surgery or emergent coronary intervention, the need for intubation/respiratory support, the requirement for defibrillation/cardioversion, and death. Alternatives to and omission of the suggested procedure were discussed.  The patient had no further AM

## 2023-10-02 NOTE — PROGRESS NOTES
0848  Pt admitted to  2E15 ambulatory for cardiac cath. Pt NPO. Patient accompanied by wife and son. Vital signs obtained. Assessment and data collection initiated. Oriented to room. Policies and procedures for 2E explained   All questions answered with no further questions at this time. Fall prevention and safety precautions discussed with patient. 1145  Pt to cath lab per bed.  1248  Pt ret'd to 2E15 post cardiac cath. Right radial site with vasc band and armboard. Pt denies any pain. IV 0.9NS cont'd @ 75 ml/hr. Family @ bedside. 1  Dr Dc Spore out to review findings with pt and family  Stent card given to pts wife - instructed to place it in his billfold. Voices understanding.   1315  Pt taking diet/fluids - pineda well

## 2023-10-02 NOTE — BRIEF OP NOTE
[address]  Sedation/Analgesia Post Sedation Record    Pt Name: Chen Peralta  Account number: [de-identified]  MRN: [id number]  YOB: 1950  Procedure Performed By: MD MD Pawel Sutherland [facility]  Primary Care Physician: Mignon Alves MD  Date: 10/2/2023    POST-PROCEDURE    Physicians/Assistants: MD MD Pawel Sutherland [facility]    Procedure Performed:Cath/ PCI      Sedation/Anesthesia: Versed/ Fentanyl and 2% xylocaine local anesthesia. Estimated Blood Loss: < 50 ml. Specimens Removed: None         Disposition of Specimen: N/A        Complications: No Immediate Complications.        Post-procedure Diagnosis/Findings:       LAD PCI x 1 GRAHAM           MD MD Pawel Sutherland, KIRSTY  Electronically signed 10/2/2023 at 12:38 PM  Interventional Cardiology

## 2023-10-02 NOTE — PLAN OF CARE
Problem: Discharge Planning  Goal: Discharge to home or other facility with appropriate resources  Outcome: Progressing  Flowsheets (Taken 10/2/2023 5168)  Discharge to home or other facility with appropriate resources:   Identify barriers to discharge with patient and caregiver   Identify discharge learning needs (meds, wound care, etc)   Refer to discharge planning if patient needs post-hospital services based on physician order or complex needs related to functional status, cognitive ability or social support system   Care plan reviewed with patient and wife. Patient and wife verbalize understanding of the plan of care and contribute to goal setting.

## 2023-10-02 NOTE — PROCEDURES
[place] [address]                            CARDIAC CATHETERIZATION    PATIENT NAME: Fely Barfield                   :        1950  MED REC NO:   [id number]                           ROOM:       [id number]  ACCOUNT NO:   [de-identified]                           ADMIT DATE: 10/02/2023  PROVIDER:     Mackenzie Mccauley MD    DATE OF PROCEDURE:  10/02/2023    CARDIAC CATHETERIZATION    SURGEON:  Mackenzie Mccauley MD    INDICATION:  Progressive angina, CCS class III, on optimal medical  therapy. DESCRIPTION OF PROCEDURE:  After informed consent was obtained from the  patient, he was brought to the cardiac catheterization laboratory and  prepped in sterile fashion. Right radial artery was chosen as the  primary point of access. Pre-procedure time-out was completed. After  infiltration of the right wrist with 2% lidocaine using micropuncture  and modified Seldinger technique under fluoroscopic guidance and  ultrasound guidance, I was able to insert a 6-Croatian Slender sheath in  the right radial artery. Standard antispasmodic/antithrombotic cocktail  was given intra-arterially. We then performed diagnostic coronary  angiography using a 5-Croatian JL-3.5 and 5-Croatian JR-4 catheters. CORONARY ANGIOGRAM:  1. Left main:  Patent without any significant obstruction. 2.  LAD:  Proximal LAD with 80% stenosis with haziness suggestive of  possible plaque rupture. There is large diagonal one branch with 50%  stenosis, diagonal two was small without any significant obstruction,  diagonal three was small without any significant obstruction. 3.  LCX:  Proximally has 40% stenosis, it gives rise to large OM-1  branch without any significant obstruction. 4.  RCA:  Mild luminal irregularities, bifurcates into the PDA and PLB  with a large AV groove branch without any significant obstruction. RCA  is dominant.     INTERVENTION:  Given the findings

## 2023-10-03 VITALS
WEIGHT: 226.19 LBS | DIASTOLIC BLOOD PRESSURE: 92 MMHG | TEMPERATURE: 98.3 F | OXYGEN SATURATION: 98 % | SYSTOLIC BLOOD PRESSURE: 130 MMHG | HEIGHT: 69 IN | BODY MASS INDEX: 33.5 KG/M2 | HEART RATE: 63 BPM | RESPIRATION RATE: 16 BRPM

## 2023-10-03 PROCEDURE — 2580000003 HC RX 258: Performed by: INTERNAL MEDICINE

## 2023-10-03 PROCEDURE — 6370000000 HC RX 637 (ALT 250 FOR IP): Performed by: INTERNAL MEDICINE

## 2023-10-03 PROCEDURE — 93226 XTRNL ECG REC<48 HR SCAN A/R: CPT

## 2023-10-03 PROCEDURE — 93225 XTRNL ECG REC<48 HRS REC: CPT

## 2023-10-03 PROCEDURE — C1725 CATH, TRANSLUMIN NON-LASER: HCPCS

## 2023-10-03 PROCEDURE — 99239 HOSP IP/OBS DSCHRG MGMT >30: CPT | Performed by: REGISTERED NURSE

## 2023-10-03 PROCEDURE — C1769 GUIDE WIRE: HCPCS

## 2023-10-03 PROCEDURE — C1874 STENT, COATED/COV W/DEL SYS: HCPCS

## 2023-10-03 PROCEDURE — C1887 CATHETER, GUIDING: HCPCS

## 2023-10-03 PROCEDURE — C1894 INTRO/SHEATH, NON-LASER: HCPCS

## 2023-10-03 RX ORDER — BENAZEPRIL HYDROCHLORIDE 10 MG/1
10 TABLET ORAL 2 TIMES DAILY
Qty: 60 TABLET | Refills: 3 | Status: SHIPPED | OUTPATIENT
Start: 2023-10-04 | End: 2023-10-12

## 2023-10-03 RX ORDER — ATORVASTATIN CALCIUM 80 MG/1
80 TABLET, FILM COATED ORAL NIGHTLY
Qty: 30 TABLET | Refills: 3 | Status: SHIPPED | OUTPATIENT
Start: 2023-10-03

## 2023-10-03 RX ORDER — ISOSORBIDE MONONITRATE 30 MG/1
30 TABLET, EXTENDED RELEASE ORAL DAILY
Qty: 30 TABLET | Refills: 3 | Status: SHIPPED | OUTPATIENT
Start: 2023-10-04 | End: 2023-10-12

## 2023-10-03 RX ADMIN — LEVOTHYROXINE SODIUM 200 MCG: 0.1 TABLET ORAL at 04:59

## 2023-10-03 RX ADMIN — ISOSORBIDE MONONITRATE 30 MG: 30 TABLET, EXTENDED RELEASE ORAL at 08:25

## 2023-10-03 RX ADMIN — SODIUM CHLORIDE, PRESERVATIVE FREE 10 ML: 5 INJECTION INTRAVENOUS at 08:19

## 2023-10-03 RX ADMIN — ASPIRIN 81 MG: 81 TABLET, CHEWABLE ORAL at 08:17

## 2023-10-03 RX ADMIN — TICAGRELOR 90 MG: 90 TABLET ORAL at 08:17

## 2023-10-03 RX ADMIN — METOPROLOL SUCCINATE 25 MG: 25 TABLET, EXTENDED RELEASE ORAL at 08:17

## 2023-10-03 RX ADMIN — HYDROCHLOROTHIAZIDE 25 MG: 25 TABLET ORAL at 08:17

## 2023-10-03 RX ADMIN — ALPRAZOLAM 0.25 MG: 0.5 TABLET ORAL at 00:45

## 2023-10-03 ASSESSMENT — PAIN SCALES - GENERAL: PAINLEVEL_OUTOF10: 0

## 2023-10-03 NOTE — PROGRESS NOTES
Evaluated cost of Brilinta for Severa Polite is not preferred by patient insurance plan, patient insurance plan prefers Clopidogrel due to patient age. Brilinta: $289.31 for first fill due to $202.05 being applied to patient deductible. Patient cost after deductible met is $87.26/month. Patient eligible for free 30-day trial from [facility]. Patient may qualify for free Brilinta through PAP. Clopidogrel: $17.24/month    If there are any questions, please call me. Thank you!  Vonda Olguin CPhT - Prescription Assistance (ext. [phone]) 10/3/2023,9:25 AM

## 2023-10-03 NOTE — PROCEDURES
48 hour Holter Monitor was applied to patient.  Patient was instructed to remove monitor on 10/5 at 931 and return to  of hospital. The serial number on the Holter Monitor is [id number]

## 2023-10-03 NOTE — PROGRESS NOTES
Discharge teaching and instructions for diagnosis/procedure of percutaneous coronary angioplasty completed with patient using teachback method. AVS reviewed. Printed prescriptions given to patient. Patient voiced understanding regarding prescriptions, follow up appointments, and care of self at home. Discharged in a wheelchair to  home with support per family.

## 2023-10-03 NOTE — DISCHARGE INSTRUCTIONS
Follow up with Dr. Yuri Crouch office in 2-4 weeks. Wear the Holter monitor for 48 hours on discharge. Have your ECHO completed in next 1-2 weeks. You will be called to schedule. Your dose of Benazepril has been lowered due to your blood pressure. If you are unable to continue to use Brilinta (for costs, or other reasons); please do NOT miss dose. Call Dr. Yuri Crouch office to discuss alternatives. Begin cardiac rehab. They will call you to schedule. Radial Artery Catheterization Site Care   No pushing, pulling or lifting anything heavier than 5 pounds with affected hand/arm for 1 week   Maintain occlusive dressing for 24-48 hours after procedure. Then may leave uncovered. Avoid soaking site in water, using hot tubs, or swimming for 1 week. No exercising for one week. After one week, you may resume normal activities   Some bruising is normal and may take 2-3 weeks to go away. If you develop pain, bleeding or swelling at the procedure site, apply firm pressure to the site for no less than 15 continuous minutes. Then slowly release the pressure. If bleeding reoccurs, or large amounts of bleeding at any time apply direct pressure and call 911.

## 2023-10-03 NOTE — PLAN OF CARE
Problem: Discharge Planning  Goal: Discharge to home or other facility with appropriate resources  10/2/2023 2200 by Claudette Martinez RN  Outcome: Progressing  10/2/2023 1012 by Jaylin Romero RN  Outcome: Progressing  Flowsheets (Taken 10/2/2023 0932)  Discharge to home or other facility with appropriate resources:   Identify barriers to discharge with patient and caregiver   Identify discharge learning needs (meds, wound care, etc)   Refer to discharge planning if patient needs post-hospital services based on physician order or complex needs related to functional status, cognitive ability or social support system   Care plan reviewed with patient. Patient verbalize understanding of the plan of care and contribute to goal setting.

## 2023-10-04 ENCOUNTER — TELEPHONE (OUTPATIENT)
Dept: CARDIOLOGY CLINIC | Age: 73
End: 2023-10-04

## 2023-10-04 LAB
EKG ATRIAL RATE: 62 BPM
EKG P AXIS: 0 DEGREES
EKG P-R INTERVAL: 170 MS
EKG Q-T INTERVAL: 420 MS
EKG QRS DURATION: 88 MS
EKG QTC CALCULATION (BAZETT): 426 MS
EKG R AXIS: 0 DEGREES
EKG T AXIS: 23 DEGREES
EKG VENTRICULAR RATE: 62 BPM

## 2023-10-04 NOTE — TELEPHONE ENCOUNTER
Received fax from [address] [place] [address] for [facility] approval. Called and informed pt of approval and that medication would be shipped to pt's address. Pt voiced understanding.

## 2023-10-09 ENCOUNTER — TELEPHONE (OUTPATIENT)
Dept: CARDIAC REHAB | Age: 73
End: 2023-10-09

## 2023-10-09 DIAGNOSIS — Z95.5 S/P PRIMARY ANGIOPLASTY WITH CORONARY STENT: Primary | ICD-10-CM

## 2023-10-09 DIAGNOSIS — Z98.61 CAD S/P PERCUTANEOUS CORONARY ANGIOPLASTY: ICD-10-CM

## 2023-10-09 DIAGNOSIS — I25.10 CAD S/P PERCUTANEOUS CORONARY ANGIOPLASTY: ICD-10-CM

## 2023-10-09 DIAGNOSIS — Z95.5 STATUS POST CORONARY ARTERY STENT PLACEMENT: Primary | ICD-10-CM

## 2023-10-09 NOTE — TELEPHONE ENCOUNTER
Called pt to schedule cardiac rehab for PCI. Evaluation scheduled for 10/24/23 @ 10:00. Cardiac rehab order was received from [facility] CNP. Hilary Bird, can you please edit or send a new cardiac rehab order with correct diagnosis? (PCI Z95.5). It must include this code. Thank you!

## 2023-10-10 ENCOUNTER — HOSPITAL ENCOUNTER (OUTPATIENT)
Dept: NON INVASIVE DIAGNOSTICS | Age: 73
Discharge: HOME | End: 2023-10-10
Payer: MEDICARE

## 2023-10-10 DIAGNOSIS — Z98.61 CAD S/P PERCUTANEOUS CORONARY ANGIOPLASTY: ICD-10-CM

## 2023-10-10 DIAGNOSIS — I25.10 CAD S/P PERCUTANEOUS CORONARY ANGIOPLASTY: ICD-10-CM

## 2023-10-10 DIAGNOSIS — I10 PRIMARY HYPERTENSION: ICD-10-CM

## 2023-10-10 PROCEDURE — 93306 TTE W/DOPPLER COMPLETE: CPT

## 2023-10-11 ENCOUNTER — TELEPHONE (OUTPATIENT)
Dept: CARDIOLOGY CLINIC | Age: 73
End: 2023-10-11

## 2023-10-11 NOTE — TELEPHONE ENCOUNTER
1. Imdur kept causing blood pressures in the range of 80-90/60 no matter how I took the other hypertensive medications. Several mornings I felt faint after taking the 30 mg dose. I decided to cut the medication to 15 mg daily in the morning. Blood pressures were now in the 100/60 range, and I felt better. 2. I have had this dry cough for a while and while reviewing my medications realized that it could be due to lotensin. Your thoughts? --- D/c IMDUR and Lotensin, follow BP  3. Five days after the stent was replaced, I developed a UTI with headaches, frequency, urgency, and blood in the urine. I saw the nurse practitioner at Dr. Michael chicas, and she prescribed Rocephin and Cipro on seven days after cardiac cath. I passed blood clots most of the day Monday but slowly that improved and now the urine is clear, but the burning and urgency remain. She is scheduling an ultrasound of the bladder and prostate. I am waiting to be called by scheduling. --Will have to continue DAPT for now, unless bleeding is very severe, in which case he should come to ED and be admitted to handle the medications. 4. I may need an alpha blocker in the future to decrease the size of the bladder in the future. I have history of orthostatis with an alpha blocker but am willing to try it again with your guidance in the management of my hypertension. The response to Flomax caught me of guard. I know what to do for it and would be more diligent in preventing should decide to choose to approve its use. --Monitor BP after Flomax. 5. I have dental work scheduled before my appointment with you. I would like to know if you use prophylactic antibiotics before a cavity repair and bridge replacement. -- No antibiotics necessary.

## 2023-10-11 NOTE — TELEPHONE ENCOUNTER
Please see My Chart Message:     Urban Glimpse \"Taye\"  P [facility] Clinical Staff (supporting Vidal Mcnamara MD) 21 minutes ago (12:32 PM)     CK  1. Imdur kept causing blood pressures in the range of 80-90/60 no matter how I took the other hypertensive medications. Several mornings I felt faint after taking the 30 mg dose. I decided to cut the medication to 15 mg daily in the morning. Blood pressures were now in the 100/60 range, and I felt better. 2. I have had this dry cough for a while and while reviewing my medications realized that it could be due to lotensin. Your thoughts? 3. Five days after the stent was replaced, I developed a UTI with headaches, frequency, urgency, and blood in the urine. I saw the nurse practitioner at Dr. Theodore Del Cid office, and she prescribed Rocephin and Cipro on seven days after cardiac cath. I passed blood clots most of the day Monday but slowly that improved and now the urine is clear, but the burning and urgency remain. She is scheduling an ultrasound of the bladder and prostate. I am waiting to be called by scheduling. 4. I may need an alpha blocker in the future to decrease the size of the bladder in the future. I have history of orthostatis with an alpha blocker but am willing to try it again with your guidance in the management of my hypertension. The response to Flomax caught me of guard. I know what to do for it and would be more diligent in preventing should decide to choose to approve its use. 5. I have dental work scheduled before my appointment with you. I would like to know if you use prophylactic antibiotics before a cavity repair and bridge replacement. Thank you.   Nimisha Conde

## 2023-10-13 NOTE — RESULT ENCOUNTER NOTE
Please let Mr. Roxana Mohamud know that his ECHO showed overall normal pump function, but he does have evidence of mild diastolic dysfunction (meaning the heart does not relax as easily) which we can discuss further at his appt on 10/26. If he would like to speak sooner, please let me know.

## 2023-10-16 ENCOUNTER — TELEPHONE (OUTPATIENT)
Dept: CARDIOLOGY CLINIC | Age: 73
End: 2023-10-16

## 2023-10-16 NOTE — TELEPHONE ENCOUNTER
Result note for ECHO    ----- Message from JAMAR Eugene CNP sent at 10/13/2023  3:11 PM EDT -----    Please let . Mariana Traylor know that his ECHO showed overall normal pump function, but he does have evidence of mild diastolic dysfunction (meaning the heart does not relax as easily) which we can discuss further at his appt on 10/26. If he would like to   speak sooner, please let me know.

## 2023-10-23 ENCOUNTER — TELEPHONE (OUTPATIENT)
Dept: CARDIAC REHAB | Age: 73
End: 2023-10-23

## 2023-10-23 NOTE — TELEPHONE ENCOUNTER
Call was placed to remind patient of upcoming cardiac rehab evaluation appointment on 10/24/2023 at 10:00am. Spoke with patient and confirmed appointment.

## 2023-10-24 ENCOUNTER — HOSPITAL ENCOUNTER (OUTPATIENT)
Dept: CARDIAC REHAB | Age: 73
Setting detail: THERAPIES SERIES
Discharge: HOME | End: 2023-10-24
Payer: MEDICARE

## 2023-10-24 PROCEDURE — G0422 INTENS CARDIAC REHAB W/EXERC: HCPCS

## 2023-10-24 ASSESSMENT — PATIENT HEALTH QUESTIONNAIRE - PHQ9
SUM OF ALL RESPONSES TO PHQ QUESTIONS 1-9: 1
9. THOUGHTS THAT YOU WOULD BE BETTER OFF DEAD, OR OF HURTING YOURSELF: 0
1. LITTLE INTEREST OR PLEASURE IN DOING THINGS: 0
SUM OF ALL RESPONSES TO PHQ QUESTIONS 1-9: 1
3. TROUBLE FALLING OR STAYING ASLEEP: 0
5. POOR APPETITE OR OVEREATING: 0
7. TROUBLE CONCENTRATING ON THINGS, SUCH AS READING THE NEWSPAPER OR WATCHING TELEVISION: 0
4. FEELING TIRED OR HAVING LITTLE ENERGY: 1
8. MOVING OR SPEAKING SO SLOWLY THAT OTHER PEOPLE COULD HAVE NOTICED. OR THE OPPOSITE, BEING SO FIGETY OR RESTLESS THAT YOU HAVE BEEN MOVING AROUND A LOT MORE THAN USUAL: 0
2. FEELING DOWN, DEPRESSED OR HOPELESS: 0
SUM OF ALL RESPONSES TO PHQ QUESTIONS 1-9: 1
6. FEELING BAD ABOUT YOURSELF - OR THAT YOU ARE A FAILURE OR HAVE LET YOURSELF OR YOUR FAMILY DOWN: 0
10. IF YOU CHECKED OFF ANY PROBLEMS, HOW DIFFICULT HAVE THESE PROBLEMS MADE IT FOR YOU TO DO YOUR WORK, TAKE CARE OF THINGS AT HOME, OR GET ALONG WITH OTHER PEOPLE: 0
SUM OF ALL RESPONSES TO PHQ9 QUESTIONS 1 & 2: 0
SUM OF ALL RESPONSES TO PHQ QUESTIONS 1-9: 1

## 2023-10-24 NOTE — PLAN OF CARE
Attended/Date Exercise Education Attended/Date Exercise Education Attended/Date All Sessions Completed? [] Yes  [] No   *Goals* *Goals* *Goals* *Goals* *Goals* *Goals*   Initial Exercise Goals Exercise Goals  Exercise Goals   Exercise Goals  Exercise Goals  Exercise Goals   Terrence plans to:  [x] Attend exercise sessions 3x/wk  [x] initiate home exercise 2-3x/wk for 10-20 min  [x] Increase 6 min walk distance by 10%  [x] Attend Exercise workshops Terrence plans to:  [x] Attend exercise sessions 3x/wk  [x] continue home exercise 2-3x/wk for 20-30 min  [x] Attend Exercise workshops Terrence's plans to:  [x] Attend exercise sessions 3x/wk  [x] continue home exercise 3-4x/wk for 30-45 min  [x] Determine plan of exercise following rehab  [x] Attend Exercise workshops Terrence's plans to:  [x] Attend exercise sessions 3x/wk  [x] continue home exercise 3-4x/wk for 30-45 min  [x] Determine plan of exercise following rehab  [x] Attend Exercise workshops Terrence's plans to:  [x] Attend exercise sessions 3x/wk  [x] continue home exercise 3-4x/wk for 30-45 min  [x] Determine plan of exercise following rehab  [x] Attend Exercise workshops Maddieyal Aguilar achieved exercise goals? []  Yes    [] No  If no, why?  **  [] Increased 6 min walk distance by 10%  [] Currently exercising 30-60 min/day, 5-7days/wk   [] Plans to continue exercise on own  [] Plans to join a local fitness center to continue exercise  [] Does not plan to continue to exercise after rehab   Exercise goals:  Maddi Aguilar exercise goals are to start exercising at home on days he is not in rehab. He has a stair master at home.   Progress towards goals:  Maddi Copper  ** Progress towards  goals:  Maddi Copper  ** Progress towards goals:  Maddi Copper ** Progress towards goals:  Maddi Copper **  Progress towards goals:  Maddi Copper  **   *MET level required for above goal:  3.4 METs MET level Achieved:  METs MET level Achieved:  METs MET level Achieved:  METs MET level Achieved:  METs MET level

## 2023-10-26 ENCOUNTER — OFFICE VISIT (OUTPATIENT)
Dept: CARDIOLOGY CLINIC | Age: 73
End: 2023-10-26
Payer: MEDICARE

## 2023-10-26 VITALS
DIASTOLIC BLOOD PRESSURE: 82 MMHG | BODY MASS INDEX: 32.08 KG/M2 | HEART RATE: 105 BPM | HEIGHT: 69 IN | SYSTOLIC BLOOD PRESSURE: 130 MMHG | WEIGHT: 216.6 LBS

## 2023-10-26 DIAGNOSIS — I10 PRIMARY HYPERTENSION: ICD-10-CM

## 2023-10-26 DIAGNOSIS — I25.10 CAD S/P PERCUTANEOUS CORONARY ANGIOPLASTY: Primary | ICD-10-CM

## 2023-10-26 DIAGNOSIS — Z98.61 CAD S/P PERCUTANEOUS CORONARY ANGIOPLASTY: Primary | ICD-10-CM

## 2023-10-26 PROCEDURE — G8484 FLU IMMUNIZE NO ADMIN: HCPCS | Performed by: STUDENT IN AN ORGANIZED HEALTH CARE EDUCATION/TRAINING PROGRAM

## 2023-10-26 PROCEDURE — 1036F TOBACCO NON-USER: CPT | Performed by: STUDENT IN AN ORGANIZED HEALTH CARE EDUCATION/TRAINING PROGRAM

## 2023-10-26 PROCEDURE — 1123F ACP DISCUSS/DSCN MKR DOCD: CPT | Performed by: STUDENT IN AN ORGANIZED HEALTH CARE EDUCATION/TRAINING PROGRAM

## 2023-10-26 PROCEDURE — 3017F COLORECTAL CA SCREEN DOC REV: CPT | Performed by: STUDENT IN AN ORGANIZED HEALTH CARE EDUCATION/TRAINING PROGRAM

## 2023-10-26 PROCEDURE — 3079F DIAST BP 80-89 MM HG: CPT | Performed by: STUDENT IN AN ORGANIZED HEALTH CARE EDUCATION/TRAINING PROGRAM

## 2023-10-26 PROCEDURE — G8427 DOCREV CUR MEDS BY ELIG CLIN: HCPCS | Performed by: STUDENT IN AN ORGANIZED HEALTH CARE EDUCATION/TRAINING PROGRAM

## 2023-10-26 PROCEDURE — G8417 CALC BMI ABV UP PARAM F/U: HCPCS | Performed by: STUDENT IN AN ORGANIZED HEALTH CARE EDUCATION/TRAINING PROGRAM

## 2023-10-26 PROCEDURE — 3075F SYST BP GE 130 - 139MM HG: CPT | Performed by: STUDENT IN AN ORGANIZED HEALTH CARE EDUCATION/TRAINING PROGRAM

## 2023-10-26 PROCEDURE — 99214 OFFICE O/P EST MOD 30 MIN: CPT | Performed by: STUDENT IN AN ORGANIZED HEALTH CARE EDUCATION/TRAINING PROGRAM

## 2023-10-26 RX ORDER — FUROSEMIDE 20 MG/1
20 TABLET ORAL PRN
Qty: 60 TABLET | Refills: 3 | Status: SHIPPED | OUTPATIENT
Start: 2023-10-26

## 2023-10-26 RX ORDER — TAMSULOSIN HYDROCHLORIDE 0.4 MG/1
0.4 CAPSULE ORAL DAILY
COMMUNITY
Start: 2023-10-12

## 2023-10-26 NOTE — PROGRESS NOTES
[facility] PROFESSIONAL SERVICES  [facility] [place] [address] [facility] [address] [id number]   Dept: [phone]   Dept Fax: [phone]   Loc: [phone]      Chief Complaint   Patient presents with    Follow Up After Procedure     S/p LAD PCI     Cardiologist:  Dr. Sandra Layne  69 yo male presents for f/u of [facility] with PCI to LAD. Hx of PVCs, AAA, HTN. Preserved EF. Has been feeling better since PCI. Gets leg swelling occ for a couple of days and BP is up during this time then improves nad BP returns to normal. He also noticies palpitaions occ. Had monitor with Pac/pvc. Mary Lev well so far. Doing okay with lipitor. All questions from cath, etc answered. General:   No fever, no chills, no weight loss, no fatigue  Pulmonary:    No dyspnea, no wheezing  Cardiac:    Denies recent chest pain + palpitations  GI:     No nausea or vomiting, no abdominal pain  Neuro:     No dizziness or light headedness  Musculoskeletal:  No recent active issues  Extremities:   occ edema      Past Medical History:   Diagnosis Date    Hypertension     Thyroid disease        Allergies   Allergen Reactions    Furacin [Nitrofurazone]      Causes wound not to heal    Norvasc [Amlodipine]      Becomes flushed       Current Outpatient Medications   Medication Sig Dispense Refill    tamsulosin (FLOMAX) 0.4 MG capsule Take 1 capsule by mouth daily      atorvastatin (LIPITOR) 80 MG tablet Take 1 tablet by mouth nightly 30 tablet 3    ticagrelor (BRILINTA) 90 MG TABS tablet Take 1 tablet by mouth 2 times daily 32 tablet 0    hydroCHLOROthiazide (HYDRODIURIL) 25 MG tablet Take 1 tablet by mouth daily      ASPIRIN PO Take 81 mg by mouth daily      nitroGLYCERIN (NITROSTAT) 0.4 MG SL tablet Place 1 tablet under the tongue every 5 minutes as needed for Chest pain up to max of 3 total doses. If no relief after 1 dose, call 911.       metoprolol succinate (TOPROL XL) 25 MG extended release tablet Take 1 tablet by

## 2023-10-26 NOTE — PROGRESS NOTES
The patient presents for a follow-up s/p LAD PCI. He had an episode of slight dizziness this morning. He has heart palpitations. He has intermittent swelling of the lower extremities, but is doing well with this right now. He does want to discuss the cycle of fluid retention that he seems to have. The patient denies chest pain and shortness of breath.

## 2023-10-30 ENCOUNTER — HOSPITAL ENCOUNTER (OUTPATIENT)
Dept: CARDIAC REHAB | Age: 73
Setting detail: THERAPIES SERIES
Discharge: HOME | End: 2023-10-30
Payer: MEDICARE

## 2023-10-30 PROCEDURE — G0423 INTENS CARDIAC REHAB NO EXER: HCPCS

## 2023-10-30 PROCEDURE — G0422 INTENS CARDIAC REHAB W/EXERC: HCPCS

## 2023-10-30 NOTE — PROGRESS NOTES
Video Education Report - ICR/CR  Name:  Taina Alvarez     Date:  10/30/2023  MRN: [id number]     Session #:  1  Session Length: 40 min    Core Videos        [x]Heart Disease Risk Reduction       []Overview of Pritikin Eating Plan      []Move it          []Calorie Density         []Healthy Minds, Bodies, Hearts        []Label Reading - Part 1       []Metabolic Syndrome and Belly Fat        []How Our Thoughts Can Heal Our Hearts   []Dining Out - Part 1      []Biomechanical Limitations  []Facts on Fat        []Hypertension & Heart Disease    []Diseases of Our Time - Focusing on Diabetes   []Body Composition  []Nutrition Action Plan    []Exercise Action Plan          Comments:  Video completed, group discussion

## 2023-11-01 ENCOUNTER — HOSPITAL ENCOUNTER (OUTPATIENT)
Dept: CARDIAC REHAB | Age: 73
Setting detail: THERAPIES SERIES
Discharge: HOME | End: 2023-11-01
Payer: MEDICARE

## 2023-11-01 PROCEDURE — G0422 INTENS CARDIAC REHAB W/EXERC: HCPCS

## 2023-11-01 PROCEDURE — G0423 INTENS CARDIAC REHAB NO EXER: HCPCS

## 2023-11-01 NOTE — PROGRESS NOTES
Video Education Report - ICR/CR  Name:  Vimal Norwood     Date:  11/1/2023  MRN: [id number]     Session #:   Session Length: 40 min    Core Videos        []Heart Disease Risk Reduction       [x]Overview of Pritikin Eating Plan      []Move it          []Calorie Density         []Healthy Minds, Bodies, Hearts        []Label Reading - Part 1       []Metabolic Syndrome and Belly Fat        []How Our Thoughts Can Heal Our Hearts   []Dining Out - Part 1      []Biomechanical Limitations  []Facts on Fat        []Hypertension & Heart Disease    []Diseases of Our Time - Focusing on Diabetes   []Body Composition  []Nutrition Action Plan    []Exercise Action Plan      Comments:  Video completed, group discussion

## 2023-11-03 ENCOUNTER — APPOINTMENT (OUTPATIENT)
Dept: CARDIAC REHAB | Age: 73
End: 2023-11-03
Payer: MEDICARE

## 2023-11-03 ENCOUNTER — HOSPITAL ENCOUNTER (OUTPATIENT)
Dept: CARDIAC REHAB | Age: 73
Setting detail: THERAPIES SERIES
End: 2023-11-03
Payer: MEDICARE

## 2023-11-06 ENCOUNTER — HOSPITAL ENCOUNTER (OUTPATIENT)
Dept: CARDIAC REHAB | Age: 73
Setting detail: THERAPIES SERIES
Discharge: HOME | End: 2023-11-06
Payer: MEDICARE

## 2023-11-06 PROCEDURE — G0423 INTENS CARDIAC REHAB NO EXER: HCPCS

## 2023-11-06 PROCEDURE — G0422 INTENS CARDIAC REHAB W/EXERC: HCPCS

## 2023-11-06 NOTE — PROGRESS NOTES
Video Education Report - ICR/CR  Name:  Huong Goetz     Date:  11/6/2023  MRN: [id number]     Session #:    Session Length: 40 min    Core Videos        []Heart Disease Risk Reduction       []Overview of Pritikin Eating Plan      [x]Move it          []Calorie Density         []Healthy Minds, Bodies, Hearts        []Label Reading - Part 1       []Metabolic Syndrome and Belly Fat        []How Our Thoughts Can Heal Our Hearts   []Dining Out - Part 1      []Biomechanical Limitations  []Facts on Fat        []Hypertension & Heart Disease    []Diseases of Our Time - Focusing on Diabetes   []Body Composition  []Nutrition Action Plan    []Exercise Action Plan      Comments:  Video completed, group discussion

## 2023-11-08 ENCOUNTER — HOSPITAL ENCOUNTER (OUTPATIENT)
Dept: CARDIAC REHAB | Age: 73
Setting detail: THERAPIES SERIES
Discharge: HOME | End: 2023-11-08
Payer: MEDICARE

## 2023-11-08 PROCEDURE — G0423 INTENS CARDIAC REHAB NO EXER: HCPCS

## 2023-11-08 PROCEDURE — G0422 INTENS CARDIAC REHAB W/EXERC: HCPCS

## 2023-11-08 NOTE — PROGRESS NOTES
Diane MARSHALL.:  1950    Acct Number: [de-identified]   MRN:  [id number]                             [facility] NUTRITION WORKSHOP             Date: 2023        Session # _______    Claudia Ralph class covered:    []   Fueling a Healthy Body  [x]   Mindful Eating  []   Targeting Nutrition Priorities  []   Dining Out :  Making the Most of a Menu  []   Label Reading    Readiness to change:    []  Pre-contemplative   []  Contemplative - ambivalent about change    [x]  Action - ready to set action plan and implement   []  Maintenance - has made change and is trying, and or practicing different alternative         behaviors     Young Hernandez was in the Workshop with the Dietitian for 45 minutes. The content was presented via Powerpoint, lecture, and patient participation based format. Motivational interviewing was utilized when needed, to promote change. Patient voiced understanding.     Electronically signed by Manuel Orr RD on 2023 at 2:40 PM

## 2023-11-10 ENCOUNTER — HOSPITAL ENCOUNTER (OUTPATIENT)
Dept: CARDIAC REHAB | Age: 73
Setting detail: THERAPIES SERIES
Discharge: HOME | End: 2023-11-10
Payer: MEDICARE

## 2023-11-10 ENCOUNTER — APPOINTMENT (OUTPATIENT)
Dept: CARDIAC REHAB | Age: 73
End: 2023-11-10
Payer: MEDICARE

## 2023-11-10 PROCEDURE — G0422 INTENS CARDIAC REHAB W/EXERC: HCPCS

## 2023-11-13 ENCOUNTER — HOSPITAL ENCOUNTER (OUTPATIENT)
Dept: CARDIAC REHAB | Age: 73
Setting detail: THERAPIES SERIES
Discharge: HOME | End: 2023-11-13
Payer: MEDICARE

## 2023-11-13 PROCEDURE — G0423 INTENS CARDIAC REHAB NO EXER: HCPCS

## 2023-11-13 PROCEDURE — G0422 INTENS CARDIAC REHAB W/EXERC: HCPCS

## 2023-11-13 NOTE — PROGRESS NOTES
Video Education Report - ICR/CR  Name:  Nirali Linares     Date:  11/13/2023  MRN: [id number]     Session #:   Session Length: 40 min    Core Videos        []Heart Disease Risk Reduction       []Overview of Pritikin Eating Plan      []Move it          [x]Calorie Density         []Healthy Minds, Bodies, Hearts        []Label Reading - Part 1       []Metabolic Syndrome and Belly Fat        []How Our Thoughts Can Heal Our Hearts   []Dining Out - Part 1      []Biomechanical Limitations  []Facts on Fat        []Hypertension & Heart Disease    []Diseases of Our Time - Focusing on Diabetes   []Body Composition  []Nutrition Action Plan    []Exercise Action Plan      Comments:  Video completed, group discussion

## 2023-11-15 ENCOUNTER — HOSPITAL ENCOUNTER (OUTPATIENT)
Dept: CARDIAC REHAB | Age: 73
Setting detail: THERAPIES SERIES
Discharge: HOME | End: 2023-11-15
Payer: MEDICARE

## 2023-11-15 PROCEDURE — G0423 INTENS CARDIAC REHAB NO EXER: HCPCS

## 2023-11-15 PROCEDURE — G0422 INTENS CARDIAC REHAB W/EXERC: HCPCS

## 2023-11-15 NOTE — PROGRESS NOTES
Jonathan MARSHALL.:  1950    Acct Number: [de-identified]   MRN:  [id number]                             [facility] HEALTHY MIND-SET WORKSHOP             Date: 11/15/2023        Session #________    [place] class covered:    []  New Thoughts New Behaviors Workshop:  Patient will learn and practice techniques for developing effective health and lifestyle goals. Patient will be able to effectively apply the goal setting process learned to develop at least one new personal goal.     []  Managing Moods & Relationships Workshop:  Patient will learn how emotional and chronic stress factors can impact their hearts. They will learn healthy ways to handle stress and utilize positive coping mechanisms. In addition, [facility] patient will learn ways to improve communication skills. [x]  Healthy Sleep for a healthy Heart:  Patients will learn the importance of sleep to overall health, well-being, and quality of life. They will understand the symptoms of, and treatments for, common sleep disorders. Patients will also be able to identify daytime and nighttime behaviors which impact sleep, and they will be able to apply these tools to help manage sleep-related challenges. []  Recognizing and Reducing Stress:  Patients will learn about stress and how to recognize stress. Patients will gain insight into the toll that chronic stress takes on their health, both emotionally and physically. Patients will learn and practice a variety of stress management techniques. Patients will be able to effectively apply coping mechanisms in perceived stressful situations. Paula Tom actively participated and verbalized understanding. Total time in the Healthy Mind-Set class was 60 minutes.     Electronically signed by CARRIE Camp on 11/15/2023 at 4:01 PM

## 2023-11-17 ENCOUNTER — HOSPITAL ENCOUNTER (OUTPATIENT)
Dept: CARDIAC REHAB | Age: 73
Setting detail: THERAPIES SERIES
End: 2023-11-17
Payer: MEDICARE

## 2023-11-17 ENCOUNTER — APPOINTMENT (OUTPATIENT)
Dept: CARDIAC REHAB | Age: 73
End: 2023-11-17
Payer: MEDICARE

## 2023-11-20 ENCOUNTER — HOSPITAL ENCOUNTER (OUTPATIENT)
Dept: CARDIAC REHAB | Age: 73
Setting detail: THERAPIES SERIES
Discharge: HOME | End: 2023-11-20
Payer: MEDICARE

## 2023-11-20 ENCOUNTER — HOSPITAL ENCOUNTER (OUTPATIENT)
Dept: CARDIAC REHAB | Age: 73
Setting detail: THERAPIES SERIES
End: 2023-11-20
Payer: MEDICARE

## 2023-11-20 PROCEDURE — G0422 INTENS CARDIAC REHAB W/EXERC: HCPCS

## 2023-11-20 NOTE — PLAN OF CARE
notify me with any concerns   [] Other     Physician Response    [x] Cardiac rehab is reasonably and medically necessary for continuous cardiac monitoring surveillance  of patient's cardiac activity  [x] Continue continuous telemetry monitoring and notify me with any concerns   [] Other

## 2023-11-22 ENCOUNTER — APPOINTMENT (OUTPATIENT)
Dept: CARDIAC REHAB | Age: 73
End: 2023-11-22
Payer: MEDICARE

## 2023-11-22 ENCOUNTER — HOSPITAL ENCOUNTER (OUTPATIENT)
Dept: CARDIAC REHAB | Age: 73
Setting detail: THERAPIES SERIES
Discharge: HOME | End: 2023-11-22
Payer: MEDICARE

## 2023-11-22 PROCEDURE — G0422 INTENS CARDIAC REHAB W/EXERC: HCPCS

## 2023-11-24 ENCOUNTER — HOSPITAL ENCOUNTER (OUTPATIENT)
Dept: CARDIAC REHAB | Age: 73
Setting detail: THERAPIES SERIES
End: 2023-11-24
Payer: MEDICARE

## 2023-11-24 ENCOUNTER — APPOINTMENT (OUTPATIENT)
Dept: CARDIAC REHAB | Age: 73
End: 2023-11-24
Payer: MEDICARE

## 2023-11-27 ENCOUNTER — APPOINTMENT (OUTPATIENT)
Dept: CARDIAC REHAB | Age: 73
End: 2023-11-27
Payer: MEDICARE

## 2023-11-29 ENCOUNTER — APPOINTMENT (OUTPATIENT)
Dept: CARDIAC REHAB | Age: 73
End: 2023-11-29
Payer: MEDICARE

## 2024-01-02 RX ORDER — ATORVASTATIN CALCIUM 80 MG/1
80 TABLET, FILM COATED ORAL NIGHTLY
Qty: 90 TABLET | Refills: 1 | Status: SHIPPED | OUTPATIENT
Start: 2024-01-02

## 2024-01-02 RX ORDER — METOPROLOL SUCCINATE 25 MG/1
25 TABLET, EXTENDED RELEASE ORAL DAILY
Qty: 90 TABLET | Refills: 1 | Status: SHIPPED | OUTPATIENT
Start: 2024-01-02

## 2024-02-09 ENCOUNTER — NURSE ONLY (OUTPATIENT)
Dept: LAB | Age: 74
End: 2024-02-09

## 2024-02-09 LAB
25(OH)D3 SERPL-MCNC: 35 NG/ML (ref 30–100)
ALBUMIN SERPL BCG-MCNC: 4.4 G/DL (ref 3.5–5.1)
ALP SERPL-CCNC: 91 U/L (ref 38–126)
ALT SERPL W/O P-5'-P-CCNC: 52 U/L (ref 11–66)
ANION GAP SERPL CALC-SCNC: 15 MEQ/L (ref 8–16)
AST SERPL-CCNC: 50 U/L (ref 5–40)
BASOPHILS ABSOLUTE: 0.1 THOU/MM3 (ref 0–0.1)
BASOPHILS NFR BLD AUTO: 0.8 %
BILIRUB SERPL-MCNC: 0.8 MG/DL (ref 0.3–1.2)
BUN SERPL-MCNC: 18 MG/DL (ref 7–22)
CALCIUM SERPL-MCNC: 9.1 MG/DL (ref 8.5–10.5)
CHLORIDE SERPL-SCNC: 99 MEQ/L (ref 98–111)
CHOLEST SERPL-MCNC: 95 MG/DL (ref 100–199)
CO2 SERPL-SCNC: 24 MEQ/L (ref 23–33)
CREAT SERPL-MCNC: 1.1 MG/DL (ref 0.4–1.2)
DEPRECATED RDW RBC AUTO: 47.8 FL (ref 35–45)
EOSINOPHIL NFR BLD AUTO: 3 %
EOSINOPHILS ABSOLUTE: 0.4 THOU/MM3 (ref 0–0.4)
ERYTHROCYTE [DISTWIDTH] IN BLOOD BY AUTOMATED COUNT: 14.3 % (ref 11.5–14.5)
GFR SERPL CREATININE-BSD FRML MDRD: > 60 ML/MIN/1.73M2
GLUCOSE SERPL-MCNC: 122 MG/DL (ref 70–108)
HCT VFR BLD AUTO: 49.9 % (ref 42–52)
HDLC SERPL-MCNC: 38 MG/DL
HGB BLD-MCNC: 16.9 GM/DL (ref 14–18)
IMM GRANULOCYTES # BLD AUTO: 0.1 THOU/MM3 (ref 0–0.07)
IMM GRANULOCYTES NFR BLD AUTO: 0.8 %
LDLC SERPL CALC-MCNC: 42 MG/DL
LYMPHOCYTES ABSOLUTE: 4 THOU/MM3 (ref 1–4.8)
LYMPHOCYTES NFR BLD AUTO: 30.5 %
MCH RBC QN AUTO: 30.9 PG (ref 26–33)
MCHC RBC AUTO-ENTMCNC: 33.9 GM/DL (ref 32.2–35.5)
MCV RBC AUTO: 91.2 FL (ref 80–94)
MONOCYTES ABSOLUTE: 1.2 THOU/MM3 (ref 0.4–1.3)
MONOCYTES NFR BLD AUTO: 9 %
NEUTROPHILS NFR BLD AUTO: 55.9 %
NRBC BLD AUTO-RTO: 0 /100 WBC
PLATELET # BLD AUTO: 438 THOU/MM3 (ref 130–400)
PMV BLD AUTO: 10.6 FL (ref 9.4–12.4)
POTASSIUM SERPL-SCNC: 3.6 MEQ/L (ref 3.5–5.2)
PROT SERPL-MCNC: 7.8 G/DL (ref 6.1–8)
PSA SERPL-MCNC: 4.21 NG/ML (ref 0–1)
RBC # BLD AUTO: 5.47 MILL/MM3 (ref 4.7–6.1)
SEGMENTED NEUTROPHILS ABSOLUTE COUNT: 7.4 THOU/MM3 (ref 1.8–7.7)
SODIUM SERPL-SCNC: 138 MEQ/L (ref 135–145)
TRIGL SERPL-MCNC: 76 MG/DL (ref 0–199)
TSH SERPL DL<=0.005 MIU/L-ACNC: 1.28 UIU/ML (ref 0.4–4.2)
WBC # BLD AUTO: 13.2 THOU/MM3 (ref 4.8–10.8)

## 2024-02-15 ENCOUNTER — HOSPITAL ENCOUNTER (OUTPATIENT)
Dept: ULTRASOUND IMAGING | Age: 74
Discharge: HOME | End: 2024-02-15
Attending: FAMILY MEDICINE
Payer: MEDICARE

## 2024-02-15 DIAGNOSIS — R35.0 URINARY FREQUENCY: ICD-10-CM

## 2024-02-15 PROCEDURE — 76775 US EXAM ABDO BACK WALL LIM: CPT | Performed by: FAMILY MEDICINE

## 2024-03-08 ENCOUNTER — OFFICE VISIT (OUTPATIENT)
Dept: UROLOGY | Age: 74
End: 2024-03-08

## 2024-03-08 VITALS — RESPIRATION RATE: 16 BRPM | BODY MASS INDEX: 31.99 KG/M2 | HEIGHT: 69 IN | WEIGHT: 216 LBS

## 2024-03-08 DIAGNOSIS — N40.1 BENIGN LOCALIZED PROSTATIC HYPERPLASIA WITH LOWER URINARY TRACT SYMPTOMS (LUTS): Primary | ICD-10-CM

## 2024-03-08 DIAGNOSIS — R31.0 GROSS HEMATURIA: ICD-10-CM

## 2024-03-08 DIAGNOSIS — R97.20 ELEVATED PSA: ICD-10-CM

## 2024-03-08 RX ORDER — DOXYCYCLINE 100 MG/1
100 CAPSULE ORAL 2 TIMES DAILY
Qty: 14 CAPSULE | Refills: 0 | Status: SHIPPED | OUTPATIENT
Start: 2024-03-08 | End: 2024-03-15

## 2024-03-08 NOTE — PROGRESS NOTES
OF SYSTEMS:  Constitutional: negative  Eyes: negative  Respiratory: negative  Cardiovascular: negative  Gastrointestinal: negative  Genitourinary: see HPI  Musculoskeletal: negative  Skin: negative   Neurological: negative  Hematological/Lymphatic: negative  Psychological: negative      Physical Exam:    This a 73 y.o. male  Vitals:    03/08/24 0953   Resp: 16     Body mass index is 31.9 kg/m².  Constitutional: Patient in no acute distress;     Assessment and Plan        1. Benign localized prostatic hyperplasia with lower urinary tract symptoms (LUTS)    2. Elevated PSA    3. Gross hematuria               Plan:      BPH- mild weak stream . Auass 9, mostly satisfied. Flomax helped. Cont this  Elevated PSA- first elevation. Doxy 7 days and recheck.  Gross hematuria- needs ct urogram and cystoscopy.            Prescriptions Ordered:  No orders of the defined types were placed in this encounter.     Orders Placed:  No orders of the defined types were placed in this encounter.           LUKE PIZARRO MD

## 2024-03-25 ENCOUNTER — OFFICE VISIT (OUTPATIENT)
Dept: PULMONOLOGY | Age: 74
End: 2024-03-25
Payer: MEDICARE

## 2024-03-25 VITALS
HEART RATE: 72 BPM | OXYGEN SATURATION: 98 % | SYSTOLIC BLOOD PRESSURE: 118 MMHG | DIASTOLIC BLOOD PRESSURE: 80 MMHG | WEIGHT: 218 LBS | HEIGHT: 69 IN | TEMPERATURE: 98.4 F | BODY MASS INDEX: 32.29 KG/M2

## 2024-03-25 DIAGNOSIS — G47.30 SLEEP APNEA, UNSPECIFIED TYPE: ICD-10-CM

## 2024-03-25 DIAGNOSIS — R06.83 SNORING: ICD-10-CM

## 2024-03-25 DIAGNOSIS — R06.81 WITNESSED EPISODE OF APNEA: ICD-10-CM

## 2024-03-25 DIAGNOSIS — G47.19 EXCESSIVE DAYTIME SLEEPINESS: Primary | ICD-10-CM

## 2024-03-25 PROCEDURE — 1123F ACP DISCUSS/DSCN MKR DOCD: CPT | Performed by: INTERNAL MEDICINE

## 2024-03-25 PROCEDURE — G8417 CALC BMI ABV UP PARAM F/U: HCPCS | Performed by: INTERNAL MEDICINE

## 2024-03-25 PROCEDURE — 3017F COLORECTAL CA SCREEN DOC REV: CPT | Performed by: INTERNAL MEDICINE

## 2024-03-25 PROCEDURE — G8484 FLU IMMUNIZE NO ADMIN: HCPCS | Performed by: INTERNAL MEDICINE

## 2024-03-25 PROCEDURE — 3079F DIAST BP 80-89 MM HG: CPT | Performed by: INTERNAL MEDICINE

## 2024-03-25 PROCEDURE — G8427 DOCREV CUR MEDS BY ELIG CLIN: HCPCS | Performed by: INTERNAL MEDICINE

## 2024-03-25 PROCEDURE — 3074F SYST BP LT 130 MM HG: CPT | Performed by: INTERNAL MEDICINE

## 2024-03-25 PROCEDURE — 1036F TOBACCO NON-USER: CPT | Performed by: INTERNAL MEDICINE

## 2024-03-25 PROCEDURE — 99204 OFFICE O/P NEW MOD 45 MIN: CPT | Performed by: INTERNAL MEDICINE

## 2024-03-25 NOTE — PROGRESS NOTES
weakness.  Extremities: No edema.  Musculoskeletal: No complaints.  Genitourinary: No complaints.  Hematological: Negative.   Psychiatric/Behavioral: Negative.   Skin: No itching.    Physical Exam:    HEIGHTHeight: 175.3 cm (5' 9\") WEIGHTWeight - Scale: 98.9 kg (218 lb)    BMI:  Body mass index is 32.19 kg/m².  Neck Size: 16.75  Oxygen Sat: room air    ESS:   7      saqli: 73  Vitals: /80 (Site: Left Upper Arm, Position: Sitting, Cuff Size: Medium Adult)   Pulse 72   Temp 98.4 °F (36.9 °C) (Infrared)   Ht 1.753 m (5' 9\")   Wt 98.9 kg (218 lb)   SpO2 98% Comment: RA  BMI 32.19 kg/m²       Mallampati Score: 4    Physical Exam :  Constitutional: Moderately built and moderately nourished. No distress.  HENT:   Head: Normocephalic and atraumatic.   Mouth/Throat: Oropharynx is clear and moist. No oral thrush.  Eyes: Conjunctivae are normal. PERRLA. No scleral icterus.   Neck: Neck supple. No JVD present. No tracheal deviation present.   Cardiovascular: Normal rate, regular rhythm, normal heart sounds. No murmur heard.   Pulmonary/Chest: Effort normal and breath sounds normal. No stridor. No respiratory distress.  No wheezes. No rales. Decreased breath sounds at R base.   Abdominal: Soft. No distension. No tenderness.   Musculoskeletal: Normal range of motion.   Lymphadenopathy:  No cervical adenopathy.   Neurological: Alert and oriented to person, place, and time. No focal deficits.  Skin: Skin is warm and dry. Patient is not diaphoretic.   Psychiatric: Normal behavior with normal mood and affect.    Diagnostic Data:    Assessment   Suspicion for obstructive sleep apnea  Suspicion for REM sleep behavior disorder       Plan   - High suspicion for CORINNE and a split Night PSG has been ordered.  Patient to follow-up in sleep clinic to review results.  Patient is amenable to wearing PAP therapy if required  - Given patient acting out his dreams at night and striking wife there is concern for REM sleep behavior

## 2024-03-26 ENCOUNTER — HOSPITAL ENCOUNTER (OUTPATIENT)
Dept: SLEEP CENTER | Age: 74
Discharge: HOME | End: 2024-03-28
Payer: MEDICARE

## 2024-03-26 DIAGNOSIS — R06.81 WITNESSED EPISODE OF APNEA: ICD-10-CM

## 2024-03-26 DIAGNOSIS — G47.30 SLEEP APNEA, UNSPECIFIED TYPE: ICD-10-CM

## 2024-03-26 DIAGNOSIS — R06.83 SNORING: ICD-10-CM

## 2024-03-26 DIAGNOSIS — G47.19 EXCESSIVE DAYTIME SLEEPINESS: ICD-10-CM

## 2024-03-26 PROCEDURE — 95811 POLYSOM 6/>YRS CPAP 4/> PARM: CPT

## 2024-03-26 PROCEDURE — 95810 POLYSOM 6/> YRS 4/> PARAM: CPT

## 2024-03-27 DIAGNOSIS — G47.10 HYPERSOMNIA: ICD-10-CM

## 2024-03-27 DIAGNOSIS — G47.19 EXCESSIVE DAYTIME SLEEPINESS: ICD-10-CM

## 2024-03-27 DIAGNOSIS — G47.33 OSA TREATED WITH BIPAP: Primary | ICD-10-CM

## 2024-03-27 DIAGNOSIS — G47.33 OSA (OBSTRUCTIVE SLEEP APNEA): ICD-10-CM

## 2024-03-27 NOTE — PROGRESS NOTES
The patient's mask was changed to a F20 size large.  This was the mask that felt comfortable and he finished the study with.  At the end of the night, he did try the N20 size large.

## 2024-03-27 NOTE — PROGRESS NOTES
[employer] [facility] or [facility]'s for PAP supplies.    F20 size medium.         Title:  CPAP/BiLevel Titration's    Approved by:  Esme Way MD      Approval Date:  March, 2024 Next Review:  March, 2026     Responsible Party: BASHIR Montiel Institution/Entities Applies to:   [facility]   Policy Number:  None    Document Type:  Such as Guideline, Policy, Policy & Procedure, or Procedure, Instructions  Manual:  Policy and Procedures    Section: IV Policy Start Date: October, 2000         POLICY: Positive airway pressure device is used to treat patients with sleep related breathing disorders characterized by full or partial occlusion of the upper airway during sleep. A patient must have undergone polysomnography and diagnosed with obstructive sleep apnea.    All individuals who record sleep studies must follow best practices for CPAP/bilevel/ASV titrations in order to attain the ideal pressure setting for their patients. Too low of pressure may cause patients to either be sub-optimally treated or to wake up in a panic. Too much pressure may cause the patient to experience bloating or mask leakage. Determining the appropriate pressure setting for each patient will lead to improved adherence and outcome. Titrations are not an exact science, and it is understood that technologists may need to make minor changes for individual patients. The procedures outlined below are meant to be a guideline and follow the spirit of the AASM clinical guidelines.      PROCEDURE:    CPAP:    Review the patients pertinent medical al history, previous sleep study or studies to ass the severity of sleep disordered breathing. Review of pertinent information will help to attain a better titration.   Applications of electrodes, montages, filters, sensitivities and scoring will be performed according to the current version of the AASM Scoring Manual.   Prior to initiating study collect

## 2024-04-03 ENCOUNTER — HOSPITAL ENCOUNTER (OUTPATIENT)
Dept: SLEEP CENTER | Age: 74
Discharge: HOME | End: 2024-04-03
Payer: MEDICARE

## 2024-04-03 ENCOUNTER — OFFICE VISIT (OUTPATIENT)
Dept: PULMONOLOGY | Age: 74
End: 2024-04-03

## 2024-04-03 VITALS
OXYGEN SATURATION: 93 % | HEIGHT: 69 IN | SYSTOLIC BLOOD PRESSURE: 118 MMHG | HEART RATE: 73 BPM | TEMPERATURE: 97.5 F | WEIGHT: 217.2 LBS | BODY MASS INDEX: 32.17 KG/M2 | DIASTOLIC BLOOD PRESSURE: 70 MMHG

## 2024-04-03 DIAGNOSIS — G47.33 OSA (OBSTRUCTIVE SLEEP APNEA): ICD-10-CM

## 2024-04-03 DIAGNOSIS — G47.33 OSA (OBSTRUCTIVE SLEEP APNEA): Primary | ICD-10-CM

## 2024-04-03 DIAGNOSIS — E66.9 OBESITY (BMI 30-39.9): ICD-10-CM

## 2024-04-03 ASSESSMENT — ENCOUNTER SYMPTOMS
COUGH: 0
SORE THROAT: 0
WHEEZING: 0
SHORTNESS OF BREATH: 0
RHINORRHEA: 0

## 2024-04-03 NOTE — PROGRESS NOTES
Results  Initial Study Date -  3/26/24  AHI -  22.7    TotalEvents - 56  (Apneas  0  Hypopneas 56  Central  0)  LM w/Arousals - 0  Sleep Efficiency - 80.5 % (Total Sleep Time - 148 min)  Time with Sats below 88% - 3.8 min    Diagnosis: Moderate obstructive sleep apnea with worsening of respiratory events during REM sleep. Obstructive hypopneas were scored using the AASM scoring desaturation rule 1B, 4%. Patient was unsuccessfully titrated up to a BiPAP of 12/7 cm of H20.He was noted to have PAP emergent central apneas. The titration was performed on room air. The patient’s sleep efficiency during diagnostic was (% of total sleep time): 80.5% No Periodic limb movements with arousals. Patient noted to have moderate snoring during the diagnostic portion of the sleep study. Decreased and delayed REM sleep limiting the interpretation of the sleep study.    Weight stable / unchanged      Sleep study data                                         Past Medical hx   PMH:  Past Medical History:   Diagnosis Date    Hypertension     Thyroid disease      SURGICAL HISTORY:  Past Surgical History:   Procedure Laterality Date    CAROTID STENT PLACEMENT  10/02/2023    TONSILLECTOMY       SOCIAL HISTORY:  Social History     Tobacco Use    Smoking status: Never   Vaping Use    Vaping Use: Never used   Substance Use Topics    Alcohol use: Yes     Alcohol/week: 5.8 standard drinks of alcohol     Types: 7 Standard drinks or equivalent per week     Comment: 2 drinks a day    Drug use: Never     ALLERGIES:  Allergies   Allergen Reactions    Furacin [Nitrofurazone]      Causes wound not to heal    Norvasc [Amlodipine]      Becomes flushed     FAMILY HISTORY:No family history on file.  CURRENT MEDICATIONS:  Current Outpatient Medications   Medication Sig Dispense Refill    metoprolol succinate (TOPROL XL) 25 MG extended release tablet Take 1 tablet by mouth daily 90 tablet 1    atorvastatin (LIPITOR) 80 MG tablet Take 1 tablet by mouth nightly

## 2024-04-04 DIAGNOSIS — G47.31 COMPLEX SLEEP APNEA SYNDROME: Primary | ICD-10-CM

## 2024-04-04 DIAGNOSIS — G47.19 EXCESSIVE DAYTIME SLEEPINESS: ICD-10-CM

## 2024-04-04 DIAGNOSIS — G47.39 TREATMENT-EMERGENT CENTRAL SLEEP APNEA: ICD-10-CM

## 2024-04-04 PROCEDURE — 95811 POLYSOM 6/>YRS CPAP 4/> PARM: CPT

## 2024-04-08 ENCOUNTER — HOSPITAL ENCOUNTER (OUTPATIENT)
Dept: SLEEP CENTER | Age: 74
Discharge: HOME | End: 2024-04-10
Payer: MEDICARE

## 2024-04-08 DIAGNOSIS — G47.31 COMPLEX SLEEP APNEA SYNDROME: ICD-10-CM

## 2024-04-08 DIAGNOSIS — G47.19 EXCESSIVE DAYTIME SLEEPINESS: ICD-10-CM

## 2024-04-08 DIAGNOSIS — G47.39 TREATMENT-EMERGENT CENTRAL SLEEP APNEA: ICD-10-CM

## 2024-04-08 PROCEDURE — 95811 POLYSOM 6/>YRS CPAP 4/> PARM: CPT

## 2024-04-09 DIAGNOSIS — G47.31 COMPLEX SLEEP APNEA SYNDROME: Primary | ICD-10-CM

## 2024-04-09 DIAGNOSIS — G47.39 TREATMENT-EMERGENT CENTRAL SLEEP APNEA: ICD-10-CM

## 2024-04-09 DIAGNOSIS — G47.19 EXCESSIVE DAYTIME SLEEPINESS: ICD-10-CM

## 2024-04-11 ENCOUNTER — TELEPHONE (OUTPATIENT)
Dept: SLEEP CENTER | Age: 74
End: 2024-04-11

## 2024-04-23 ENCOUNTER — NURSE ONLY (OUTPATIENT)
Dept: LAB | Age: 74
End: 2024-04-23

## 2024-04-23 DIAGNOSIS — R97.20 ELEVATED PSA: ICD-10-CM

## 2024-04-24 ENCOUNTER — HOSPITAL ENCOUNTER (OUTPATIENT)
Dept: CT IMAGING | Age: 74
Discharge: HOME | End: 2024-04-24
Attending: UROLOGY
Payer: MEDICARE

## 2024-04-24 DIAGNOSIS — R31.0 GROSS HEMATURIA: ICD-10-CM

## 2024-04-24 LAB
POC CREATININE WHOLE BLOOD: 1.1 MG/DL (ref 0.5–1.2)
PSA SERPL-MCNC: 4.02 NG/ML (ref 0–1)

## 2024-04-24 PROCEDURE — 74178 CT ABD&PLV WO CNTR FLWD CNTR: CPT | Performed by: UROLOGY

## 2024-04-24 PROCEDURE — 6360000004 HC RX CONTRAST MEDICATION: Performed by: UROLOGY

## 2024-04-24 PROCEDURE — 82565 ASSAY OF CREATININE: CPT

## 2024-04-24 RX ADMIN — IOPAMIDOL 80 ML: 755 INJECTION, SOLUTION INTRAVENOUS at 09:18

## 2024-04-30 ENCOUNTER — PROCEDURE VISIT (OUTPATIENT)
Dept: UROLOGY | Age: 74
End: 2024-04-30
Payer: MEDICARE

## 2024-04-30 VITALS — RESPIRATION RATE: 20 BRPM | HEIGHT: 69 IN | WEIGHT: 220 LBS | BODY MASS INDEX: 32.58 KG/M2

## 2024-04-30 DIAGNOSIS — R31.0 GROSS HEMATURIA: ICD-10-CM

## 2024-04-30 DIAGNOSIS — N40.1 BENIGN LOCALIZED PROSTATIC HYPERPLASIA WITH LOWER URINARY TRACT SYMPTOMS (LUTS): Primary | ICD-10-CM

## 2024-04-30 DIAGNOSIS — R97.20 ELEVATED PSA: ICD-10-CM

## 2024-04-30 PROCEDURE — 52000 CYSTOURETHROSCOPY: CPT | Performed by: UROLOGY

## 2024-04-30 NOTE — PROGRESS NOTES
Cystoscopy    Operative Note    Patient:  Terrence Otoole  MRN: [id number]  YOB: 1950    Date: 04/30/24  Surgeon: LUKE PIZARRO MD  Anesthesia: Urojet Local  Indications:       BPH- mild weak stream . Auass 9, mostly satisfied. Flomax helped. Cont this  Elevated PSA- first elevation. Doxy 7 days and recheck.  Gross hematuria- needs ct urogram and cystoscopy.    imaging independently reviewed by LUKE PIZARRO MD and radiology report verified demonstrating     CT UROGRAM    Result Date: 4/24/2024  PROCEDURE: CT UROGRAM CLINICAL INFORMATION: Gross hematuria. COMPARISON: CT abdomen 3/19/2013. TECHNIQUE: Axial 5 mm CT images were obtained through the abdomen and pelvis after the administration of 80  cc Isovue 370 intravenous contrast. Coronal and sagittal reconstructions were obtained. All CT scans at this facility use dose modulation, iterative reconstruction, and/or weight-based dosing when appropriate to reduce radiation dose to as low as reasonably achievable. FINDINGS: Lung bases: Dependent atelectasis/scarring is noted at the limited images through the lung bases. Liver/gallbladder/bilary tree: Hepatomegaly and hepatic steatosis are unchanged. No radiopaque gallstones or biliary ductal dilatation is identified. Pancreas: Remains atrophic. Spleen : Remains small. Adrenal glands: Normal. Kidneys/ ureters/ bladder: No renal calculus, hydronephrosis, or hydroureter is visualized. The urinary bladder is partially distended and grossly unremarkable. Subcentimeter hypoattenuating right renal lesions are too small to characterize definitively but appear stable (series 3, image 44). The urinary bladder is partially distended with mild nonspecific bladder wall thickening observed. Gastrointestinal:  Colonic diverticulosis without diverticulitis is observed. The appendix is normal. No bowel obstruction, free fluid, fluid collection, or free air is visualized. Retroperitoneum / lymph nodes: The aorta is

## 2024-05-02 ENCOUNTER — TELEPHONE (OUTPATIENT)
Dept: UROLOGY | Age: 74
End: 2024-05-02

## 2024-05-02 NOTE — TELEPHONE ENCOUNTER
Patient scheduled for MRI PROSTATE W WO  at [facility] on 5/16/24.  Arrival of 600 AM for a 630 AM scan time.  Order mailed with instructions or given to the patient in the office

## 2024-05-06 ENCOUNTER — OFFICE VISIT (OUTPATIENT)
Dept: CARDIOLOGY CLINIC | Age: 74
End: 2024-05-06
Payer: MEDICARE

## 2024-05-06 VITALS
DIASTOLIC BLOOD PRESSURE: 95 MMHG | SYSTOLIC BLOOD PRESSURE: 148 MMHG | WEIGHT: 217.2 LBS | BODY MASS INDEX: 32.17 KG/M2 | HEIGHT: 69 IN | HEART RATE: 81 BPM

## 2024-05-06 DIAGNOSIS — I25.10 CAD S/P PERCUTANEOUS CORONARY ANGIOPLASTY: Primary | ICD-10-CM

## 2024-05-06 DIAGNOSIS — I10 PRIMARY HYPERTENSION: ICD-10-CM

## 2024-05-06 DIAGNOSIS — Z98.61 CAD S/P PERCUTANEOUS CORONARY ANGIOPLASTY: Primary | ICD-10-CM

## 2024-05-06 PROCEDURE — 3080F DIAST BP >= 90 MM HG: CPT | Performed by: INTERNAL MEDICINE

## 2024-05-06 PROCEDURE — 1123F ACP DISCUSS/DSCN MKR DOCD: CPT | Performed by: INTERNAL MEDICINE

## 2024-05-06 PROCEDURE — G8417 CALC BMI ABV UP PARAM F/U: HCPCS | Performed by: INTERNAL MEDICINE

## 2024-05-06 PROCEDURE — 3077F SYST BP >= 140 MM HG: CPT | Performed by: INTERNAL MEDICINE

## 2024-05-06 PROCEDURE — 1036F TOBACCO NON-USER: CPT | Performed by: INTERNAL MEDICINE

## 2024-05-06 PROCEDURE — 3017F COLORECTAL CA SCREEN DOC REV: CPT | Performed by: INTERNAL MEDICINE

## 2024-05-06 PROCEDURE — 99213 OFFICE O/P EST LOW 20 MIN: CPT | Performed by: INTERNAL MEDICINE

## 2024-05-06 PROCEDURE — G8427 DOCREV CUR MEDS BY ELIG CLIN: HCPCS | Performed by: INTERNAL MEDICINE

## 2024-05-06 NOTE — PATIENT INSTRUCTIONS
Your nurses today were ALVA Loja and MARIA DEL CARMEN Esqueda  Your provider today was Dr. Ortiz  Phone number: [phone]

## 2024-05-06 NOTE — PROGRESS NOTES
standard drinks of alcohol per week. He reports that he does not use drugs.    Family History  Terrence family history is not on file.    There is no family history of bicuspid aortic valve, aneurysms, heart transplant, pacemakers, defibrillators, or sudden cardiac death.      Past Surgical History   Past Surgical History:   Procedure Laterality Date    CAROTID STENT PLACEMENT  10/02/2023    TONSILLECTOMY         Review of Systems   Constitutional: Negative for chills and fever  HENT: Negative for congestion, sinus pressure, sneezing and sore throat.    Eyes: Negative for pain, discharge, redness and itching.   Respiratory: Negative for apnea, cough  Gastrointestinal: Negative for blood in stool, constipation, diarrhea   Endocrine: Negative for cold intolerance, heat intolerance, polydipsia.  Genitourinary: Negative for dysuria, enuresis, flank pain and hematuria.   Musculoskeletal: Negative for arthralgias, joint swelling and neck pain.   Neurological: Negative for numbness and headaches.   Psychiatric/Behavioral: Negative for agitation, confusion, decreased concentration and dysphoric mood.     Objective:     BP (!) 148/95   Pulse 81   Ht 1.753 m (5' 9\")   Wt 98.5 kg (217 lb 3.2 oz)   BMI 32.07 kg/m²     Wt Readings from Last 3 Encounters:   05/06/24 98.5 kg (217 lb 3.2 oz)   04/30/24 99.8 kg (220 lb)   04/03/24 98.5 kg (217 lb 3.2 oz)     BP Readings from Last 3 Encounters:   05/06/24 (!) 148/95   04/03/24 118/70   03/25/24 118/80       Nursing note and vitals reviewed.    Physical Exam   Constitutional: Oriented to person, place, and time. Appears well-developed and well-nourished.   HENT:   Head: Normocephalic and atraumatic.   Eyes: EOM are normal. Pupils are equal, round, and reactive to light.   Neck: Normal range of motion. Neck supple. No JVD present.   Cardiovascular: Normal rate, regular rhythm, normal heart sounds and intact distal pulses.    No murmur heard.  Pulmonary/Chest: Effort normal and

## 2024-05-16 ENCOUNTER — HOSPITAL ENCOUNTER (OUTPATIENT)
Dept: MRI IMAGING | Age: 74
Discharge: HOME | End: 2024-05-16
Attending: UROLOGY
Payer: MEDICARE

## 2024-05-16 DIAGNOSIS — R97.20 ELEVATED PSA: ICD-10-CM

## 2024-05-16 PROCEDURE — 76377 3D RENDER W/INTRP POSTPROCES: CPT

## 2024-05-16 PROCEDURE — 6360000004 HC RX CONTRAST MEDICATION: Performed by: UROLOGY

## 2024-05-16 PROCEDURE — A9579 GAD-BASE MR CONTRAST NOS,1ML: HCPCS | Performed by: UROLOGY

## 2024-05-16 RX ADMIN — GADOTERIDOL 20 ML: 279.3 INJECTION, SOLUTION INTRAVENOUS at 07:11

## 2024-05-28 ENCOUNTER — SCHEDULED TELEPHONE ENCOUNTER (OUTPATIENT)
Dept: UROLOGY | Age: 74
End: 2024-05-28
Payer: MEDICARE

## 2024-05-28 DIAGNOSIS — R97.20 ELEVATED PSA: Primary | ICD-10-CM

## 2024-05-28 PROCEDURE — 99441 PR PHYS/QHP TELEPHONE EVALUATION 5-10 MIN: CPT | Performed by: UROLOGY

## 2024-06-05 NOTE — PROGRESS NOTES
Documentation:  I communicated with the patient and/or health care decision maker about prostate mri results.   Details of this discussion including any medical advice provided:     imaging independently reviewed by LUKE PIZARRO MD and radiology report verified demonstrating     MRI PROSTATE W WO CONTRAST    Result Date: 5/16/2024  PROCEDURE: MRI PROSTATE W WO CONTRAST CLINICAL INFORMATION: Elevated prostate specific antigen (PSA) COMPARISON: CT urogram 4/24/2024 TECHNIQUE: Axial T2, coronal T2 and sagittal T2 imaging of the prostate gland. Large field of view axial T2 imaging of the prostate gland. Axial T1, axial T1 VIBE dynamic post ruben and axial T1 VIBE dynamic subtracted post ruben images through the prostate gland. Diffusion 50, 800, 1400 and ADC maps through the prostate gland. Axial T1 STAR VIBE post ruben through the pelvis. 3-D images reconstructed on a separate [employer] [place] [employer] workstation with MRI TRUS fusion of prostate mass lesions. CONTRAST: 20  mL of ProHance  intravenously. LABORATORY: 1. PSA on 4/23/2024 was 4.02 ng/ml 2. PSA on 2/9/2024 was 4.21 ng/ml 3. PSA on 8/11/2023 was 1.93 ng/ml FINDINGS: PROSTATE SIZE: 1. The prostate gland is moderately enlarged measuring 5.6 x 5.5 x 4.2 cm 2. The prostate volume is 67.27 ml. TRANSITIONAL ZONE: 1. Heterogeneous appearance 2. Encapsulated and nonencapsulated nodules are seen as can be seen with PI-RADS 2 CENTRAL ZONE: 1. Unremarkable. PERIPHERAL ZONE: 1. Predominantly intermediate T2 signal that would reflect post inflammatory changes. 2. No restricted diffusion in the peripheral zone. SEMINAL VESICLES: Atrophic appearance NEUROVASCULAR BUNDLES: Intact URINARY BLADDER/RECTUM/PELVIC DIAPHRAGM: Unremarkable. PATHOLOGICALLY ENLARGED LYMPH NODES: 1. None. OSSEOUS STRUCTURES: 1. Unremarkable. OTHER: 1. Small bilateral fat-containing inguinal hernias.     1. Moderate prostatomegaly. 2. Overall PI-RADS assessment is PI-RADS 2. **PI-RADS v 2.1 assessment categories**

## 2024-06-17 RX ORDER — METOPROLOL SUCCINATE 25 MG/1
25 TABLET, EXTENDED RELEASE ORAL DAILY
Qty: 90 TABLET | Refills: 3 | Status: SHIPPED | OUTPATIENT
Start: 2024-06-17

## 2024-06-17 RX ORDER — FUROSEMIDE 20 MG/1
20 TABLET ORAL PRN
Qty: 90 TABLET | Refills: 3 | Status: SHIPPED | OUTPATIENT
Start: 2024-06-17

## 2024-06-17 RX ORDER — ATORVASTATIN CALCIUM 80 MG/1
80 TABLET, FILM COATED ORAL NIGHTLY
Qty: 90 TABLET | Refills: 3 | Status: SHIPPED | OUTPATIENT
Start: 2024-06-17

## 2024-07-10 ENCOUNTER — OFFICE VISIT (OUTPATIENT)
Dept: PULMONOLOGY | Age: 74
End: 2024-07-10
Payer: MEDICARE

## 2024-07-10 VITALS
DIASTOLIC BLOOD PRESSURE: 80 MMHG | HEART RATE: 61 BPM | WEIGHT: 216.8 LBS | SYSTOLIC BLOOD PRESSURE: 140 MMHG | BODY MASS INDEX: 32.11 KG/M2 | OXYGEN SATURATION: 94 % | TEMPERATURE: 98.2 F | HEIGHT: 69 IN

## 2024-07-10 DIAGNOSIS — E66.9 OBESITY (BMI 30-39.9): ICD-10-CM

## 2024-07-10 DIAGNOSIS — G47.33 OSA (OBSTRUCTIVE SLEEP APNEA): Primary | ICD-10-CM

## 2024-07-10 PROCEDURE — 3079F DIAST BP 80-89 MM HG: CPT

## 2024-07-10 PROCEDURE — 1123F ACP DISCUSS/DSCN MKR DOCD: CPT

## 2024-07-10 PROCEDURE — 99213 OFFICE O/P EST LOW 20 MIN: CPT

## 2024-07-10 PROCEDURE — 3077F SYST BP >= 140 MM HG: CPT

## 2024-07-10 ASSESSMENT — ENCOUNTER SYMPTOMS
RHINORRHEA: 0
COUGH: 0
WHEEZING: 0
SORE THROAT: 0
SHORTNESS OF BREATH: 0

## 2024-07-10 NOTE — PROGRESS NOTES
External ear normal.      Mouth/Throat:      Mouth: Mucous membranes are moist.      Pharynx: No oropharyngeal exudate or posterior oropharyngeal erythema.   Eyes:      General:         Right eye: No discharge.         Left eye: No discharge.   Cardiovascular:      Rate and Rhythm: Normal rate.   Pulmonary:      Effort: Pulmonary effort is normal. No respiratory distress.      Breath sounds: No wheezing, rhonchi or rales.   Musculoskeletal:      Cervical back: Neck supple.      Right lower leg: No edema.      Left lower leg: No edema.   Skin:     General: Skin is warm and dry.   Neurological:      General: No focal deficit present.      Mental Status: He is alert.   Psychiatric:         Mood and Affect: Mood normal.         Behavior: Behavior normal.         Thought Content: Thought content normal.            Information added by my medical assistant/LPN was reviewed today.    Electronically signed by JAMAR Calderon CNP on 7/10/2024 at 10:20 AM

## 2024-08-07 ENCOUNTER — LAB (OUTPATIENT)
Dept: LAB | Age: 74
End: 2024-08-07

## 2024-08-07 LAB — 25(OH)D3 SERPL-MCNC: 33 NG/ML (ref 30–100)

## 2024-08-14 DIAGNOSIS — M81.0 SENILE OSTEOPOROSIS: ICD-10-CM

## 2024-08-14 DIAGNOSIS — M85.80 OSTEOPENIA, UNSPECIFIED LOCATION: ICD-10-CM

## 2024-08-14 DIAGNOSIS — M89.9 OSTEOPATHIA: ICD-10-CM

## 2024-08-14 PROCEDURE — 77080 DXA BONE DENSITY AXIAL: CPT

## 2024-09-11 ENCOUNTER — PATIENT MESSAGE (OUTPATIENT)
Dept: CARDIOLOGY CLINIC | Age: 74
End: 2024-09-11

## 2024-09-11 ENCOUNTER — TELEPHONE (OUTPATIENT)
Dept: CARDIOLOGY CLINIC | Age: 74
End: 2024-09-11

## 2024-10-21 ENCOUNTER — HOSPITAL ENCOUNTER (OUTPATIENT)
Dept: CT IMAGING | Age: 74
Discharge: HOME | End: 2024-10-21
Payer: MEDICARE

## 2024-10-21 DIAGNOSIS — I71.21 ASCENDING AORTIC ANEURYSM, UNSPECIFIED WHETHER RUPTURED (HCC): ICD-10-CM

## 2024-10-21 LAB — POC CREATININE WHOLE BLOOD: 1 MG/DL (ref 0.5–1.2)

## 2024-10-21 PROCEDURE — 71275 CT ANGIOGRAPHY CHEST: CPT

## 2024-10-21 PROCEDURE — 6360000004 HC RX CONTRAST MEDICATION: Performed by: THORACIC SURGERY (CARDIOTHORACIC VASCULAR SURGERY)

## 2024-10-21 PROCEDURE — 82565 ASSAY OF CREATININE: CPT

## 2024-10-21 RX ORDER — IOPAMIDOL 755 MG/ML
80 INJECTION, SOLUTION INTRAVASCULAR
Status: COMPLETED | OUTPATIENT
Start: 2024-10-21 | End: 2024-10-21

## 2024-10-21 RX ADMIN — IOPAMIDOL 80 ML: 755 INJECTION, SOLUTION INTRAVENOUS at 07:26

## 2024-11-13 RX ORDER — METOPROLOL SUCCINATE 25 MG/1
25 TABLET, EXTENDED RELEASE ORAL DAILY
Qty: 90 TABLET | Refills: 1 | Status: SHIPPED | OUTPATIENT
Start: 2024-11-13

## 2024-11-25 ENCOUNTER — LAB (OUTPATIENT)
Dept: LAB | Age: 74
End: 2024-11-25

## 2024-11-25 DIAGNOSIS — R97.20 ELEVATED PSA: ICD-10-CM

## 2024-11-25 DIAGNOSIS — N40.1 BENIGN LOCALIZED PROSTATIC HYPERPLASIA WITH LOWER URINARY TRACT SYMPTOMS (LUTS): ICD-10-CM

## 2024-11-25 LAB — PSA SERPL-MCNC: 3.36 NG/ML (ref 0–1)

## 2024-12-12 ENCOUNTER — OFFICE VISIT (OUTPATIENT)
Dept: UROLOGY | Age: 74
End: 2024-12-12
Payer: MEDICARE

## 2024-12-12 VITALS — HEIGHT: 69 IN | RESPIRATION RATE: 18 BRPM | BODY MASS INDEX: 31.99 KG/M2 | WEIGHT: 216 LBS

## 2024-12-12 DIAGNOSIS — R97.20 ELEVATED PSA: Primary | ICD-10-CM

## 2024-12-12 PROCEDURE — G8417 CALC BMI ABV UP PARAM F/U: HCPCS | Performed by: UROLOGY

## 2024-12-12 PROCEDURE — 1123F ACP DISCUSS/DSCN MKR DOCD: CPT | Performed by: UROLOGY

## 2024-12-12 PROCEDURE — 1159F MED LIST DOCD IN RCRD: CPT | Performed by: UROLOGY

## 2024-12-12 PROCEDURE — G8484 FLU IMMUNIZE NO ADMIN: HCPCS | Performed by: UROLOGY

## 2024-12-12 PROCEDURE — 1036F TOBACCO NON-USER: CPT | Performed by: UROLOGY

## 2024-12-12 PROCEDURE — G8427 DOCREV CUR MEDS BY ELIG CLIN: HCPCS | Performed by: UROLOGY

## 2024-12-12 PROCEDURE — 99214 OFFICE O/P EST MOD 30 MIN: CPT | Performed by: UROLOGY

## 2024-12-12 PROCEDURE — 3017F COLORECTAL CA SCREEN DOC REV: CPT | Performed by: UROLOGY

## 2025-02-05 ENCOUNTER — LAB (OUTPATIENT)
Dept: LAB | Age: 75
End: 2025-02-05

## 2025-02-05 LAB
ALBUMIN SERPL BCG-MCNC: 4.2 G/DL (ref 3.5–5.1)
ALP SERPL-CCNC: 75 U/L (ref 38–126)
ALT SERPL W/O P-5'-P-CCNC: 40 U/L (ref 11–66)
ANION GAP SERPL CALC-SCNC: 18 MEQ/L (ref 8–16)
AST SERPL-CCNC: 33 U/L (ref 5–40)
BASOPHILS ABSOLUTE: 0.1 THOU/MM3 (ref 0–0.1)
BASOPHILS NFR BLD AUTO: 0.9 %
BILIRUB SERPL-MCNC: 1.4 MG/DL (ref 0.3–1.2)
BUN SERPL-MCNC: 23 MG/DL (ref 7–22)
CALCIUM SERPL-MCNC: 9.6 MG/DL (ref 8.5–10.5)
CHLORIDE SERPL-SCNC: 97 MEQ/L (ref 98–111)
CO2 SERPL-SCNC: 25 MEQ/L (ref 23–33)
CREAT SERPL-MCNC: 0.9 MG/DL (ref 0.4–1.2)
DEPRECATED RDW RBC AUTO: 48.7 FL (ref 35–45)
EOSINOPHIL NFR BLD AUTO: 2.7 %
EOSINOPHILS ABSOLUTE: 0.4 THOU/MM3 (ref 0–0.4)
ERYTHROCYTE [DISTWIDTH] IN BLOOD BY AUTOMATED COUNT: 14.7 % (ref 11.5–14.5)
GFR SERPL CREATININE-BSD FRML MDRD: 90 ML/MIN/1.73M2
GLUCOSE SERPL-MCNC: 98 MG/DL (ref 70–108)
HCT VFR BLD AUTO: 50.1 % (ref 42–52)
HGB BLD-MCNC: 17 GM/DL (ref 14–18)
IMM GRANULOCYTES # BLD AUTO: 0.16 THOU/MM3 (ref 0–0.07)
IMM GRANULOCYTES NFR BLD AUTO: 1.1 %
LDLC SERPL DIRECT ASSAY-MCNC: 73.56 MG/DL
LYMPHOCYTES ABSOLUTE: 4.8 THOU/MM3 (ref 1–4.8)
LYMPHOCYTES NFR BLD AUTO: 32.9 %
MCH RBC QN AUTO: 30.9 PG (ref 26–33)
MCHC RBC AUTO-ENTMCNC: 33.9 GM/DL (ref 32.2–35.5)
MCV RBC AUTO: 91.1 FL (ref 80–94)
MONOCYTES ABSOLUTE: 1.4 THOU/MM3 (ref 0.4–1.3)
MONOCYTES NFR BLD AUTO: 9.7 %
NEUTROPHILS ABSOLUTE: 7.7 THOU/MM3 (ref 1.8–7.7)
NEUTROPHILS NFR BLD AUTO: 52.7 %
NRBC BLD AUTO-RTO: 0 /100 WBC
PLATELET # BLD AUTO: 371 THOU/MM3 (ref 130–400)
PMV BLD AUTO: 10.6 FL (ref 9.4–12.4)
POTASSIUM SERPL-SCNC: 3.6 MEQ/L (ref 3.5–5.2)
PROT SERPL-MCNC: 7.6 G/DL (ref 6.1–8)
RBC # BLD AUTO: 5.5 MILL/MM3 (ref 4.7–6.1)
SODIUM SERPL-SCNC: 140 MEQ/L (ref 135–145)
TSH SERPL DL<=0.005 MIU/L-ACNC: 2.81 UIU/ML (ref 0.4–4.2)
WBC # BLD AUTO: 14.7 THOU/MM3 (ref 4.8–10.8)

## 2025-03-12 ENCOUNTER — LAB (OUTPATIENT)
Dept: LAB | Age: 75
End: 2025-03-12

## 2025-03-12 LAB
BASOPHILS ABSOLUTE: 0.1 THOU/MM3 (ref 0–0.1)
BASOPHILS NFR BLD AUTO: 0.8 %
DEPRECATED RDW RBC AUTO: 53.9 FL (ref 35–45)
EOSINOPHIL NFR BLD AUTO: 5.3 %
EOSINOPHILS ABSOLUTE: 0.5 THOU/MM3 (ref 0–0.4)
ERYTHROCYTE [DISTWIDTH] IN BLOOD BY AUTOMATED COUNT: 15.9 % (ref 11.5–14.5)
HCT VFR BLD AUTO: 45.6 % (ref 42–52)
HGB BLD-MCNC: 15 GM/DL (ref 14–18)
IMM GRANULOCYTES # BLD AUTO: 0.07 THOU/MM3 (ref 0–0.07)
IMM GRANULOCYTES NFR BLD AUTO: 0.7 %
LYMPHOCYTES ABSOLUTE: 3.3 THOU/MM3 (ref 1–4.8)
LYMPHOCYTES NFR BLD AUTO: 33.2 %
MCH RBC QN AUTO: 30.8 PG (ref 26–33)
MCHC RBC AUTO-ENTMCNC: 32.9 GM/DL (ref 32.2–35.5)
MCV RBC AUTO: 93.6 FL (ref 80–94)
MONOCYTES ABSOLUTE: 0.9 THOU/MM3 (ref 0.4–1.3)
MONOCYTES NFR BLD AUTO: 9.2 %
NEUTROPHILS ABSOLUTE: 5 THOU/MM3 (ref 1.8–7.7)
NEUTROPHILS NFR BLD AUTO: 50.8 %
NRBC BLD AUTO-RTO: 0 /100 WBC
PLATELET # BLD AUTO: 336 THOU/MM3 (ref 130–400)
PMV BLD AUTO: 11 FL (ref 9.4–12.4)
RBC # BLD AUTO: 4.87 MILL/MM3 (ref 4.7–6.1)
WBC # BLD AUTO: 9.9 THOU/MM3 (ref 4.8–10.8)

## 2025-04-02 ENCOUNTER — TELEPHONE (OUTPATIENT)
Dept: CARDIOLOGY CLINIC | Age: 75
End: 2025-04-02

## 2025-04-02 NOTE — TELEPHONE ENCOUNTER
Pre op Risk Assessment    Procedure Right Total Knee Replacement  Physician IOS  Date of surgery/procedure 04/16/2025    Last OV 05/06/2024  Last Stress 05/13/2019  Last Echo 10/10/2023  Last Cath 10/02/2023  Last Stent 10/02/2023  Is patient on blood thinners Aspirin  Hold Meds/how many days Please advise      Fax: [phone]

## 2025-04-30 ENCOUNTER — OFFICE VISIT (OUTPATIENT)
Dept: CARDIOLOGY CLINIC | Age: 75
End: 2025-04-30
Payer: MEDICARE

## 2025-04-30 VITALS
SYSTOLIC BLOOD PRESSURE: 136 MMHG | BODY MASS INDEX: 32.14 KG/M2 | DIASTOLIC BLOOD PRESSURE: 86 MMHG | HEIGHT: 69 IN | WEIGHT: 217 LBS | HEART RATE: 78 BPM

## 2025-04-30 DIAGNOSIS — Z98.61 CAD S/P PERCUTANEOUS CORONARY ANGIOPLASTY: Primary | ICD-10-CM

## 2025-04-30 DIAGNOSIS — Z01.810 PREOPERATIVE CARDIOVASCULAR EXAMINATION: ICD-10-CM

## 2025-04-30 DIAGNOSIS — I25.10 CAD S/P PERCUTANEOUS CORONARY ANGIOPLASTY: Primary | ICD-10-CM

## 2025-04-30 PROCEDURE — G8427 DOCREV CUR MEDS BY ELIG CLIN: HCPCS | Performed by: INTERNAL MEDICINE

## 2025-04-30 PROCEDURE — 1159F MED LIST DOCD IN RCRD: CPT | Performed by: INTERNAL MEDICINE

## 2025-04-30 PROCEDURE — 3017F COLORECTAL CA SCREEN DOC REV: CPT | Performed by: INTERNAL MEDICINE

## 2025-04-30 PROCEDURE — 3079F DIAST BP 80-89 MM HG: CPT | Performed by: INTERNAL MEDICINE

## 2025-04-30 PROCEDURE — G8417 CALC BMI ABV UP PARAM F/U: HCPCS | Performed by: INTERNAL MEDICINE

## 2025-04-30 PROCEDURE — 99214 OFFICE O/P EST MOD 30 MIN: CPT | Performed by: INTERNAL MEDICINE

## 2025-04-30 PROCEDURE — 1036F TOBACCO NON-USER: CPT | Performed by: INTERNAL MEDICINE

## 2025-04-30 PROCEDURE — 3075F SYST BP GE 130 - 139MM HG: CPT | Performed by: INTERNAL MEDICINE

## 2025-04-30 PROCEDURE — 93000 ELECTROCARDIOGRAM COMPLETE: CPT | Performed by: INTERNAL MEDICINE

## 2025-04-30 PROCEDURE — 1123F ACP DISCUSS/DSCN MKR DOCD: CPT | Performed by: INTERNAL MEDICINE

## 2025-04-30 RX ORDER — ATORVASTATIN CALCIUM 80 MG/1
80 TABLET, FILM COATED ORAL NIGHTLY
Qty: 90 TABLET | Refills: 3 | Status: SHIPPED | OUTPATIENT
Start: 2025-04-30

## 2025-04-30 RX ORDER — METOPROLOL SUCCINATE 25 MG/1
25 TABLET, EXTENDED RELEASE ORAL DAILY
Qty: 90 TABLET | Refills: 3 | Status: SHIPPED | OUTPATIENT
Start: 2025-04-30

## 2025-04-30 RX ORDER — FUROSEMIDE 20 MG/1
20 TABLET ORAL PRN
Qty: 90 TABLET | Refills: 3 | Status: SHIPPED | OUTPATIENT
Start: 2025-04-30

## 2025-04-30 NOTE — PROGRESS NOTES
Pre op for TKA on 05/14/2025  Was scheduled for 04/16/2025 and then patient had shingles.   Requesting updated cardiac clearance form  OIO completed EKG, will call for tracing  Denies any cardiac concerns or symptoms.   
Results   Component Value Date/Time     02/05/2025 08:53 AM    K 3.6 02/05/2025 08:53 AM    CL 97 02/05/2025 08:53 AM    CO2 25 02/05/2025 08:53 AM    BUN 23 02/05/2025 08:53 AM    CREATININE 0.9 02/05/2025 08:53 AM    CALCIUM 9.6 02/05/2025 08:53 AM    LABGLOM 90 02/05/2025 08:53 AM    LABGLOM >60 02/09/2024 08:50 AM    GLUCOSE 98 02/05/2025 08:53 AM    GLUCOSE 96 08/06/2018 09:15 AM       Lab Results   Component Value Date/Time    ALKPHOS 75 02/05/2025 08:53 AM    ALT 40 02/05/2025 08:53 AM    AST 33 02/05/2025 08:53 AM    BILITOT 1.4 02/05/2025 08:53 AM    BILIDIR 0.1 03/16/2013 06:43 PM       No results found for: \"MG\"    Lab Results   Component Value Date    INR 0.88 10/02/2023         No results found for: \"LABA1C\"    Lab Results   Component Value Date/Time    TRIG 76 02/09/2024 08:50 AM    HDL 38 02/09/2024 08:50 AM    LDLDIRECT 73.56 02/05/2025 08:53 AM       Lab Results   Component Value Date/Time    TSH 2.810 02/05/2025 08:53 AM         Testing Reviewed:      I have individually reviewed the cardiac test below:    ECHO: No results found for this or any previous visit.          s/p PCI of the LAD x 1 GRAHAM, 1/2024 - on high-intensity statin  Preserved EF  HTN  Hypothyroidism  CORINNE on CPAP  Pre-operative CV exam  Possible upcoming Orthopedic knee surgery  (Intermediate risk surgery)  Intermediate risk patient  No active cardiac complaints.    No cardiac issues on exams.  Able to do > 4 METS.  Patient accepts the risk of the planned procedure and understands the possibility of giselle-operative cardiac event, including but not limited to MI, VT/VF, cardiac arrest, and death, and wishes to proceed with the procedure.  No further cardiac testing prior to upcoming surgery.'    Assessment & Plan  He is scheduled for a total knee replacement surgery on 05/14/2025. He has been advised to discontinue baby aspirin 5 days prior to the surgical procedure. An EKG from today shows no major issue.    2. Medication

## 2025-05-27 ENCOUNTER — TRANSCRIBE ORDERS (OUTPATIENT)
Dept: ADMINISTRATIVE | Age: 75
End: 2025-05-27

## 2025-05-27 ENCOUNTER — HOSPITAL ENCOUNTER (OUTPATIENT)
Dept: INTERVENTIONAL RADIOLOGY/VASCULAR | Age: 75
Discharge: HOME | End: 2025-05-29
Attending: ORTHOPAEDIC SURGERY
Payer: MEDICARE

## 2025-05-27 DIAGNOSIS — M79.89 SWELLING OF LIMB: ICD-10-CM

## 2025-05-27 DIAGNOSIS — M79.661 PAIN OF RIGHT LOWER LEG: ICD-10-CM

## 2025-05-27 DIAGNOSIS — M79.661 PAIN OF RIGHT LOWER LEG: Primary | ICD-10-CM

## 2025-05-27 PROCEDURE — 93971 EXTREMITY STUDY: CPT

## 2025-07-08 NOTE — PROGRESS NOTES
Dispense Refill    HYDROcodone-acetaminophen (NORCO) 5-325 MG per tablet TAKE 1-2 TABLETS BY MOUTH EVERY 6 HOURS AS NEEDED FOR PAIN 7 DAYS      atorvastatin (LIPITOR) 80 MG tablet Take 1 tablet by mouth nightly 90 tablet 3    furosemide (LASIX) 20 MG tablet Take 1 tablet by mouth as needed (for weight gain or swelling) 90 tablet 3    metoprolol succinate (TOPROL XL) 25 MG extended release tablet Take 1 tablet by mouth daily 90 tablet 3    CALCIUM PO       tamsulosin (FLOMAX) 0.4 MG capsule Take 1 capsule by mouth daily      hydroCHLOROthiazide (HYDRODIURIL) 25 MG tablet Take 1 tablet by mouth daily      ASPIRIN PO Take 81 mg by mouth daily      nitroGLYCERIN (NITROSTAT) 0.4 MG SL tablet Place 1 tablet under the tongue every 5 minutes as needed for Chest pain up to max of 3 total doses. If no relief after 1 dose, call 911.      levothyroxine (SYNTHROID) 200 MCG tablet Take 1 tablet by mouth Daily      ALPRAZolam (XANAX) 0.25 MG tablet Take 1 tablet by mouth nightly as needed.       No current facility-administered medications for this visit.       Review of systems     Review of Systems   Constitutional:  Negative for appetite change and fever.   HENT:  Negative for congestion, postnasal drip, rhinorrhea, sneezing and sore throat.    Respiratory:  Negative for cough, shortness of breath and wheezing.    Cardiovascular: Negative.  Negative for chest pain, palpitations and leg swelling.   Allergic/Immunologic: Negative for environmental allergies.          Physical exam     BMI:  Body mass index is 32.5 kg/m².    Wt Readings from Last 3 Encounters:   07/09/25 99.8 kg (220 lb 1.6 oz)   04/30/25 98.4 kg (217 lb)   12/12/24 98 kg (216 lb)     Vitals: BP (!) 142/80 (BP Site: Left Upper Arm, Patient Position: Sitting, BP Cuff Size: Medium Adult)   Pulse 79   Temp 98.8 °F (37.1 °C) (Infrared)   Ht 1.753 m (5' 9\")   Wt 99.8 kg (220 lb 1.6 oz)   SpO2 95% Comment: ra  BMI 32.50 kg/m²       Physical Exam  Vitals and

## 2025-07-09 ENCOUNTER — OFFICE VISIT (OUTPATIENT)
Age: 75
End: 2025-07-09
Payer: MEDICARE

## 2025-07-09 VITALS
TEMPERATURE: 98.8 F | WEIGHT: 220.1 LBS | BODY MASS INDEX: 32.6 KG/M2 | SYSTOLIC BLOOD PRESSURE: 142 MMHG | DIASTOLIC BLOOD PRESSURE: 80 MMHG | OXYGEN SATURATION: 95 % | HEIGHT: 69 IN | HEART RATE: 79 BPM

## 2025-07-09 DIAGNOSIS — E66.9 OBESITY (BMI 30-39.9): ICD-10-CM

## 2025-07-09 DIAGNOSIS — G47.39 COMPLEX SLEEP APNEA SYNDROME: Primary | ICD-10-CM

## 2025-07-09 PROCEDURE — 1036F TOBACCO NON-USER: CPT

## 2025-07-09 PROCEDURE — 99214 OFFICE O/P EST MOD 30 MIN: CPT

## 2025-07-09 PROCEDURE — G8427 DOCREV CUR MEDS BY ELIG CLIN: HCPCS

## 2025-07-09 PROCEDURE — G8417 CALC BMI ABV UP PARAM F/U: HCPCS

## 2025-07-09 PROCEDURE — 3077F SYST BP >= 140 MM HG: CPT

## 2025-07-09 PROCEDURE — 3079F DIAST BP 80-89 MM HG: CPT

## 2025-07-09 PROCEDURE — 1123F ACP DISCUSS/DSCN MKR DOCD: CPT

## 2025-07-09 PROCEDURE — 3017F COLORECTAL CA SCREEN DOC REV: CPT

## 2025-07-09 PROCEDURE — 1159F MED LIST DOCD IN RCRD: CPT

## 2025-07-09 RX ORDER — HYDROCODONE BITARTRATE AND ACETAMINOPHEN 5; 325 MG/1; MG/1
TABLET ORAL
COMMUNITY
Start: 2025-05-14

## 2025-07-09 ASSESSMENT — ENCOUNTER SYMPTOMS
SORE THROAT: 0
COUGH: 0
WHEEZING: 0
RHINORRHEA: 0
SHORTNESS OF BREATH: 0

## 2025-07-29 ENCOUNTER — LAB (OUTPATIENT)
Dept: LAB | Age: 75
End: 2025-07-29

## 2025-07-29 LAB
ANION GAP SERPL CALC-SCNC: 14 MEQ/L (ref 8–16)
BUN SERPL-MCNC: 14 MG/DL (ref 8–23)
CHLORIDE SERPL-SCNC: 101 MEQ/L (ref 98–111)
CO2 SERPL-SCNC: 25 MEQ/L (ref 22–29)
CREAT SERPL-MCNC: 1 MG/DL (ref 0.7–1.2)
GFR SERPL CREATININE-BSD FRML MDRD: 79 ML/MIN/1.73M2
POTASSIUM SERPL-SCNC: 3.5 MEQ/L (ref 3.5–5.2)
SODIUM SERPL-SCNC: 140 MEQ/L (ref 135–145)